# Patient Record
Sex: FEMALE | Race: WHITE | Employment: FULL TIME | URBAN - METROPOLITAN AREA
[De-identification: names, ages, dates, MRNs, and addresses within clinical notes are randomized per-mention and may not be internally consistent; named-entity substitution may affect disease eponyms.]

---

## 2017-01-10 RX ORDER — AMANTADINE HYDROCHLORIDE 100 MG/1
CAPSULE, GELATIN COATED ORAL
Qty: 60 CAPSULE | Refills: 0 | Status: SHIPPED | OUTPATIENT
Start: 2017-01-10 | End: 2017-02-07 | Stop reason: SDUPTHER

## 2017-01-20 RX ORDER — TRAZODONE HYDROCHLORIDE 100 MG/1
TABLET ORAL
Qty: 30 TABLET | Refills: 2 | Status: SHIPPED | OUTPATIENT
Start: 2017-01-20 | End: 2017-04-26 | Stop reason: SDUPTHER

## 2017-01-26 RX ORDER — ZOLPIDEM TARTRATE 10 MG/1
TABLET ORAL
Qty: 45 TABLET | Refills: 0 | OUTPATIENT
Start: 2017-01-26

## 2017-01-30 ENCOUNTER — TELEPHONE (OUTPATIENT)
Dept: FAMILY MEDICINE CLINIC | Age: 62
End: 2017-01-30

## 2017-01-31 ENCOUNTER — OFFICE VISIT (OUTPATIENT)
Dept: FAMILY MEDICINE CLINIC | Age: 62
End: 2017-01-31

## 2017-01-31 VITALS
SYSTOLIC BLOOD PRESSURE: 120 MMHG | OXYGEN SATURATION: 98 % | WEIGHT: 109.1 LBS | HEART RATE: 70 BPM | HEIGHT: 59 IN | BODY MASS INDEX: 22 KG/M2 | DIASTOLIC BLOOD PRESSURE: 80 MMHG

## 2017-01-31 DIAGNOSIS — M54.17 LUMBOSACRAL RADICULOPATHY: ICD-10-CM

## 2017-01-31 DIAGNOSIS — F51.02 ADJUSTMENT INSOMNIA: ICD-10-CM

## 2017-01-31 PROCEDURE — 99213 OFFICE O/P EST LOW 20 MIN: CPT | Performed by: FAMILY MEDICINE

## 2017-01-31 RX ORDER — ZOLPIDEM TARTRATE 10 MG/1
TABLET ORAL
Qty: 45 TABLET | Refills: 0 | Status: SHIPPED | OUTPATIENT
Start: 2017-01-31 | End: 2017-03-07 | Stop reason: SDUPTHER

## 2017-01-31 ASSESSMENT — ENCOUNTER SYMPTOMS
COUGH: 0
SWOLLEN GLANDS: 0
CHANGE IN BOWEL HABIT: 0
VOMITING: 0
SORE THROAT: 0
NAUSEA: 0
VISUAL CHANGE: 0
ABDOMINAL PAIN: 0

## 2017-03-07 RX ORDER — ZOLPIDEM TARTRATE 10 MG/1
TABLET ORAL
Qty: 45 TABLET | Refills: 0 | Status: SHIPPED | OUTPATIENT
Start: 2017-03-07 | End: 2017-04-07 | Stop reason: SDUPTHER

## 2017-04-07 RX ORDER — ZOLPIDEM TARTRATE 10 MG/1
TABLET ORAL
Qty: 45 TABLET | Refills: 0 | Status: SHIPPED | OUTPATIENT
Start: 2017-04-07 | End: 2017-05-12 | Stop reason: SDUPTHER

## 2017-04-26 RX ORDER — TRAZODONE HYDROCHLORIDE 100 MG/1
TABLET ORAL
Qty: 30 TABLET | Refills: 1 | Status: SHIPPED | OUTPATIENT
Start: 2017-04-26 | End: 2017-06-22 | Stop reason: SDUPTHER

## 2017-05-02 ENCOUNTER — OFFICE VISIT (OUTPATIENT)
Dept: FAMILY MEDICINE CLINIC | Age: 62
End: 2017-05-02

## 2017-05-02 VITALS
HEIGHT: 59 IN | OXYGEN SATURATION: 96 % | BODY MASS INDEX: 22.46 KG/M2 | WEIGHT: 111.4 LBS | HEART RATE: 91 BPM | SYSTOLIC BLOOD PRESSURE: 110 MMHG | DIASTOLIC BLOOD PRESSURE: 80 MMHG

## 2017-05-02 DIAGNOSIS — F51.02 ADJUSTMENT INSOMNIA: ICD-10-CM

## 2017-05-02 PROCEDURE — 99213 OFFICE O/P EST LOW 20 MIN: CPT | Performed by: FAMILY MEDICINE

## 2017-05-02 RX ORDER — IBUPROFEN 600 MG/1
600 TABLET ORAL
COMMUNITY
Start: 2017-03-29 | End: 2022-08-09

## 2017-05-02 RX ORDER — AMITRIPTYLINE HYDROCHLORIDE 10 MG/1
10 TABLET, FILM COATED ORAL NIGHTLY
COMMUNITY
End: 2018-03-06

## 2017-05-02 ASSESSMENT — ENCOUNTER SYMPTOMS
CHANGE IN BOWEL HABIT: 0
COUGH: 0
SORE THROAT: 0
NAUSEA: 0
VOMITING: 0
SWOLLEN GLANDS: 0
ABDOMINAL PAIN: 0
VISUAL CHANGE: 0

## 2017-05-12 ENCOUNTER — TELEPHONE (OUTPATIENT)
Dept: FAMILY MEDICINE CLINIC | Age: 62
End: 2017-05-12

## 2017-05-12 RX ORDER — ZOLPIDEM TARTRATE 10 MG/1
TABLET ORAL
Qty: 45 TABLET | Refills: 0 | Status: SHIPPED | OUTPATIENT
Start: 2017-05-12 | End: 2017-06-09 | Stop reason: SDUPTHER

## 2017-06-09 RX ORDER — ZOLPIDEM TARTRATE 10 MG/1
TABLET ORAL
Qty: 45 TABLET | Refills: 0 | Status: SHIPPED | OUTPATIENT
Start: 2017-06-09 | End: 2017-07-13 | Stop reason: SDUPTHER

## 2017-06-14 RX ORDER — ZOLPIDEM TARTRATE 10 MG/1
TABLET ORAL
Qty: 45 TABLET | Refills: 0 | Status: SHIPPED | OUTPATIENT
Start: 2017-06-14 | End: 2017-07-13

## 2017-06-22 RX ORDER — TRAZODONE HYDROCHLORIDE 100 MG/1
TABLET ORAL
Qty: 30 TABLET | Refills: 0 | Status: SHIPPED | OUTPATIENT
Start: 2017-06-22 | End: 2017-07-22 | Stop reason: SDUPTHER

## 2017-07-13 RX ORDER — ZOLPIDEM TARTRATE 10 MG/1
TABLET ORAL
Qty: 45 TABLET | Refills: 0 | Status: SHIPPED | OUTPATIENT
Start: 2017-07-13 | End: 2017-09-13 | Stop reason: SDUPTHER

## 2017-08-09 ENCOUNTER — OFFICE VISIT (OUTPATIENT)
Dept: FAMILY MEDICINE CLINIC | Age: 62
End: 2017-08-09

## 2017-08-09 VITALS
DIASTOLIC BLOOD PRESSURE: 80 MMHG | HEIGHT: 59 IN | SYSTOLIC BLOOD PRESSURE: 120 MMHG | WEIGHT: 109.3 LBS | BODY MASS INDEX: 22.04 KG/M2 | HEART RATE: 67 BPM | OXYGEN SATURATION: 98 %

## 2017-08-09 DIAGNOSIS — Z00.00 WELL ADULT EXAM: ICD-10-CM

## 2017-08-09 DIAGNOSIS — I10 ESSENTIAL HYPERTENSION: Primary | ICD-10-CM

## 2017-08-09 DIAGNOSIS — Z11.4 SCREENING FOR HIV WITHOUT PRESENCE OF RISK FACTORS: ICD-10-CM

## 2017-08-09 DIAGNOSIS — I10 ESSENTIAL HYPERTENSION: ICD-10-CM

## 2017-08-09 DIAGNOSIS — Z11.59 NEED FOR HEPATITIS C SCREENING TEST: ICD-10-CM

## 2017-08-09 DIAGNOSIS — F51.02 ADJUSTMENT INSOMNIA: ICD-10-CM

## 2017-08-09 DIAGNOSIS — Z12.11 COLON CANCER SCREENING: ICD-10-CM

## 2017-08-09 DIAGNOSIS — G89.4 CHRONIC PAIN SYNDROME: ICD-10-CM

## 2017-08-09 DIAGNOSIS — Z12.11 SCREENING FOR COLON CANCER: ICD-10-CM

## 2017-08-09 LAB
CHOLESTEROL, FASTING: 242 MG/DL (ref 0–199)
HDLC SERPL-MCNC: 91 MG/DL (ref 40–60)
HEPATITIS C ANTIBODY INTERPRETATION: NORMAL
LDL CHOLESTEROL CALCULATED: 109 MG/DL
TRIGLYCERIDE, FASTING: 211 MG/DL (ref 0–150)
VLDLC SERPL CALC-MCNC: 42 MG/DL

## 2017-08-09 PROCEDURE — 99213 OFFICE O/P EST LOW 20 MIN: CPT | Performed by: FAMILY MEDICINE

## 2017-08-09 RX ORDER — CHLORZOXAZONE 750 MG/1
750 TABLET ORAL
COMMUNITY
Start: 2017-06-22 | End: 2022-08-09

## 2017-08-09 RX ORDER — LISINOPRIL 10 MG/1
TABLET ORAL
Qty: 90 TABLET | Refills: 3 | Status: SHIPPED | OUTPATIENT
Start: 2017-08-09 | End: 2018-06-06 | Stop reason: SDUPTHER

## 2017-08-09 ASSESSMENT — ENCOUNTER SYMPTOMS
SHORTNESS OF BREATH: 0
ORTHOPNEA: 0
BLURRED VISION: 0

## 2017-08-09 ASSESSMENT — PATIENT HEALTH QUESTIONNAIRE - PHQ9
2. FEELING DOWN, DEPRESSED OR HOPELESS: 0
SUM OF ALL RESPONSES TO PHQ9 QUESTIONS 1 & 2: 0
1. LITTLE INTEREST OR PLEASURE IN DOING THINGS: 0
SUM OF ALL RESPONSES TO PHQ QUESTIONS 1-9: 0

## 2017-08-10 LAB — HIV-1 AND HIV-2 ANTIBODIES: NORMAL

## 2017-08-16 RX ORDER — AMANTADINE HYDROCHLORIDE 100 MG/1
CAPSULE, GELATIN COATED ORAL
Qty: 60 CAPSULE | Refills: 4 | Status: SHIPPED | OUTPATIENT
Start: 2017-08-16 | End: 2018-10-19 | Stop reason: SDUPTHER

## 2017-09-13 RX ORDER — ZOLPIDEM TARTRATE 10 MG/1
TABLET ORAL
Qty: 45 TABLET | Refills: 0 | Status: SHIPPED | OUTPATIENT
Start: 2017-09-13 | End: 2017-10-16 | Stop reason: SDUPTHER

## 2017-10-05 ENCOUNTER — TELEPHONE (OUTPATIENT)
Dept: FAMILY MEDICINE CLINIC | Age: 62
End: 2017-10-05

## 2017-10-05 RX ORDER — PREDNISONE 10 MG/1
10 TABLET ORAL DAILY
Qty: 5 TABLET | Refills: 0 | Status: SHIPPED | OUTPATIENT
Start: 2017-10-05 | End: 2017-10-10

## 2017-10-05 NOTE — TELEPHONE ENCOUNTER
Just pain. No other symptoms. It has happened before. Took a 5 mg prednisone and it helped. Was under a lot of stress on Tuesday night. It is better today but still painful around the edges. No drainage no redness, no swelling. She used some eye drops to clean it out and all it did was burn. Please advise.

## 2017-10-16 ENCOUNTER — TELEPHONE (OUTPATIENT)
Dept: FAMILY MEDICINE CLINIC | Age: 62
End: 2017-10-16

## 2017-10-16 RX ORDER — ZOLPIDEM TARTRATE 10 MG/1
TABLET ORAL
Qty: 45 TABLET | Refills: 0 | Status: SHIPPED | OUTPATIENT
Start: 2017-10-16 | End: 2017-11-15 | Stop reason: SDUPTHER

## 2017-11-16 RX ORDER — ZOLPIDEM TARTRATE 10 MG/1
TABLET ORAL
Qty: 45 TABLET | Refills: 0 | Status: SHIPPED | OUTPATIENT
Start: 2017-11-16 | End: 2017-12-21 | Stop reason: SDUPTHER

## 2017-12-06 ENCOUNTER — OFFICE VISIT (OUTPATIENT)
Dept: FAMILY MEDICINE CLINIC | Age: 62
End: 2017-12-06

## 2017-12-06 VITALS
BODY MASS INDEX: 22.98 KG/M2 | WEIGHT: 114 LBS | HEIGHT: 59 IN | OXYGEN SATURATION: 95 % | HEART RATE: 82 BPM | SYSTOLIC BLOOD PRESSURE: 120 MMHG | DIASTOLIC BLOOD PRESSURE: 70 MMHG

## 2017-12-06 DIAGNOSIS — I10 ESSENTIAL HYPERTENSION: ICD-10-CM

## 2017-12-06 DIAGNOSIS — F51.02 ADJUSTMENT INSOMNIA: ICD-10-CM

## 2017-12-06 PROCEDURE — 99213 OFFICE O/P EST LOW 20 MIN: CPT | Performed by: FAMILY MEDICINE

## 2017-12-06 RX ORDER — CYCLOBENZAPRINE HYDROCHLORIDE 15 MG/1
15 CAPSULE, EXTENDED RELEASE ORAL
COMMUNITY
Start: 2017-10-05

## 2017-12-06 ASSESSMENT — ENCOUNTER SYMPTOMS
ORTHOPNEA: 0
SHORTNESS OF BREATH: 0
BLURRED VISION: 0

## 2017-12-06 NOTE — PROGRESS NOTES
Subjective:      Patient ID: Talita Ayala is a 63 y.o. female. Hypertension   This is a chronic problem. The current episode started more than 1 year ago. The problem is unchanged. The problem is controlled. Pertinent negatives include no anxiety, blurred vision, chest pain, headaches, malaise/fatigue, neck pain, orthopnea, palpitations, peripheral edema, PND, shortness of breath or sweats. There are no associated agents to hypertension. Risk factors for coronary artery disease include family history. Past treatments include ACE inhibitors. The current treatment provides significant improvement. There are no compliance problems. There is no history of angina, kidney disease, CAD/MI, CVA, heart failure, left ventricular hypertrophy, PVD, renovascular disease, retinopathy or a thyroid problem. There is no history of chronic renal disease, coarctation of the aorta, hyperaldosteronism, hypercortisolism, hyperparathyroidism, a hypertension causing med, pheochromocytoma or sleep apnea. Review of Systems   Constitutional: Negative for malaise/fatigue. Eyes: Negative for blurred vision. Respiratory: Negative for shortness of breath. Cardiovascular: Negative for chest pain, palpitations, orthopnea and PND. Musculoskeletal: Negative for neck pain. Neurological: Negative for headaches. Objective:   Physical Exam   Constitutional: She is oriented to person, place, and time. She appears well-developed and well-nourished. No distress. HENT:   Mouth/Throat: Oropharynx is clear and moist.   Eyes: Conjunctivae are normal. Pupils are equal, round, and reactive to light. Neck: No JVD present. No tracheal deviation present. No thyromegaly present. Cardiovascular: Normal rate, regular rhythm, normal heart sounds and intact distal pulses. Exam reveals no gallop. No murmur heard. Pulmonary/Chest: Effort normal and breath sounds normal. No stridor. No respiratory distress. She has no wheezes.  She has no rales. She exhibits no tenderness. Abdominal: Soft. Bowel sounds are normal. She exhibits no distension and no mass. There is no tenderness. Musculoskeletal: She exhibits no edema or tenderness. Lymphadenopathy:     She has no cervical adenopathy. Neurological: She is alert and oriented to person, place, and time. She displays normal reflexes. No cranial nerve deficit. She exhibits normal muscle tone. Coordination normal.   Skin: Skin is warm and dry. No rash noted. No erythema. No pallor. Psychiatric: She has a normal mood and affect. Her behavior is normal. Judgment and thought content normal.   Vitals reviewed. Assessment:      Essential hypertension  Stable--same plan TOS and SE discussed    Insomnia  Stable--same plan    Controlled Substances Monitoring:     Attestation: The Prescription Monitoring Report for this patient was reviewed today. James Santos MD)  Documentation: Possible medication side effects, risk of tolerance and/or dependence, and alternative treatments discussed., No signs of potential drug abuse or diversion identified., Existing medication contract.  James Santos MD)              Plan:      above    --reviewed recent drug screen and OK

## 2017-12-21 RX ORDER — ZOLPIDEM TARTRATE 10 MG/1
TABLET ORAL
Qty: 45 TABLET | Refills: 0 | Status: SHIPPED | OUTPATIENT
Start: 2017-12-21 | End: 2018-01-23 | Stop reason: SDUPTHER

## 2018-01-18 RX ORDER — TRAZODONE HYDROCHLORIDE 100 MG/1
TABLET ORAL
Qty: 30 TABLET | Refills: 4 | Status: SHIPPED | OUTPATIENT
Start: 2018-01-18 | End: 2018-06-19 | Stop reason: SDUPTHER

## 2018-01-19 RX ORDER — TRAZODONE HYDROCHLORIDE 100 MG/1
TABLET ORAL
Qty: 30 TABLET | Refills: 4 | Status: SHIPPED | OUTPATIENT
Start: 2018-01-19 | End: 2018-06-19

## 2018-01-23 RX ORDER — ZOLPIDEM TARTRATE 10 MG/1
TABLET ORAL
Qty: 45 TABLET | Refills: 0 | Status: SHIPPED | OUTPATIENT
Start: 2018-01-23 | End: 2018-02-21 | Stop reason: SDUPTHER

## 2018-02-12 RX ORDER — PROMETHAZINE HYDROCHLORIDE 25 MG/1
TABLET ORAL
Qty: 40 TABLET | Refills: 1 | Status: SHIPPED | OUTPATIENT
Start: 2018-02-12 | End: 2018-02-13

## 2018-02-13 RX ORDER — PROMETHAZINE HYDROCHLORIDE 25 MG/1
TABLET ORAL
Qty: 40 TABLET | Refills: 1 | Status: SHIPPED | OUTPATIENT
Start: 2018-02-13 | End: 2019-04-07

## 2018-02-21 RX ORDER — ZOLPIDEM TARTRATE 10 MG/1
TABLET ORAL
Qty: 45 TABLET | Refills: 0 | Status: SHIPPED | OUTPATIENT
Start: 2018-02-21 | End: 2018-03-22 | Stop reason: SDUPTHER

## 2018-03-06 ENCOUNTER — OFFICE VISIT (OUTPATIENT)
Dept: FAMILY MEDICINE CLINIC | Age: 63
End: 2018-03-06

## 2018-03-06 VITALS
DIASTOLIC BLOOD PRESSURE: 80 MMHG | HEART RATE: 96 BPM | BODY MASS INDEX: 23.93 KG/M2 | HEIGHT: 59 IN | SYSTOLIC BLOOD PRESSURE: 120 MMHG | OXYGEN SATURATION: 98 % | WEIGHT: 118.7 LBS

## 2018-03-06 DIAGNOSIS — F51.02 ADJUSTMENT INSOMNIA: ICD-10-CM

## 2018-03-06 DIAGNOSIS — I10 ESSENTIAL HYPERTENSION: Primary | ICD-10-CM

## 2018-03-06 LAB
ANION GAP SERPL CALCULATED.3IONS-SCNC: 15 MMOL/L (ref 3–16)
BASOPHILS ABSOLUTE: 0 K/UL (ref 0–0.2)
BASOPHILS RELATIVE PERCENT: 0.7 %
BUN BLDV-MCNC: 23 MG/DL (ref 7–20)
CALCIUM SERPL-MCNC: 9.2 MG/DL (ref 8.3–10.6)
CHLORIDE BLD-SCNC: 105 MMOL/L (ref 99–110)
CO2: 24 MMOL/L (ref 21–32)
CREAT SERPL-MCNC: 0.7 MG/DL (ref 0.6–1.2)
EOSINOPHILS ABSOLUTE: 0 K/UL (ref 0–0.6)
EOSINOPHILS RELATIVE PERCENT: 0.7 %
GFR AFRICAN AMERICAN: >60
GFR NON-AFRICAN AMERICAN: >60
GLUCOSE BLD-MCNC: 77 MG/DL (ref 70–99)
HCT VFR BLD CALC: 33.4 % (ref 36–48)
HEMOGLOBIN: 11 G/DL (ref 12–16)
LYMPHOCYTES ABSOLUTE: 3.2 K/UL (ref 1–5.1)
LYMPHOCYTES RELATIVE PERCENT: 49 %
MCH RBC QN AUTO: 30.3 PG (ref 26–34)
MCHC RBC AUTO-ENTMCNC: 33.1 G/DL (ref 31–36)
MCV RBC AUTO: 91.8 FL (ref 80–100)
MONOCYTES ABSOLUTE: 0.4 K/UL (ref 0–1.3)
MONOCYTES RELATIVE PERCENT: 6.1 %
NEUTROPHILS ABSOLUTE: 2.8 K/UL (ref 1.7–7.7)
NEUTROPHILS RELATIVE PERCENT: 43.5 %
PDW BLD-RTO: 15.4 % (ref 12.4–15.4)
PLATELET # BLD: 238 K/UL (ref 135–450)
PMV BLD AUTO: 7.7 FL (ref 5–10.5)
POTASSIUM SERPL-SCNC: 4.9 MMOL/L (ref 3.5–5.1)
RBC # BLD: 3.64 M/UL (ref 4–5.2)
SODIUM BLD-SCNC: 144 MMOL/L (ref 136–145)
WBC # BLD: 6.5 K/UL (ref 4–11)

## 2018-03-06 PROCEDURE — 99213 OFFICE O/P EST LOW 20 MIN: CPT | Performed by: FAMILY MEDICINE

## 2018-03-06 RX ORDER — LANOLIN ALCOHOL/MO/W.PET/CERES
CREAM (GRAM) TOPICAL
COMMUNITY

## 2018-03-06 ASSESSMENT — ENCOUNTER SYMPTOMS
NAUSEA: 0
COUGH: 0
VOMITING: 0
SORE THROAT: 0
SWOLLEN GLANDS: 0
CHANGE IN BOWEL HABIT: 0
VISUAL CHANGE: 0
ABDOMINAL PAIN: 0

## 2018-03-22 RX ORDER — ZOLPIDEM TARTRATE 10 MG/1
TABLET ORAL
Qty: 45 TABLET | Refills: 0 | Status: SHIPPED | OUTPATIENT
Start: 2018-03-22 | End: 2018-04-23 | Stop reason: SDUPTHER

## 2018-04-23 RX ORDER — ZOLPIDEM TARTRATE 10 MG/1
TABLET ORAL
Qty: 45 TABLET | Refills: 0 | Status: SHIPPED | OUTPATIENT
Start: 2018-04-23 | End: 2018-05-25 | Stop reason: SDUPTHER

## 2018-05-25 RX ORDER — ZOLPIDEM TARTRATE 10 MG/1
TABLET ORAL
Qty: 45 TABLET | Refills: 0 | Status: SHIPPED | OUTPATIENT
Start: 2018-05-25 | End: 2018-06-28 | Stop reason: SDUPTHER

## 2018-06-06 ENCOUNTER — OFFICE VISIT (OUTPATIENT)
Dept: FAMILY MEDICINE CLINIC | Age: 63
End: 2018-06-06

## 2018-06-06 VITALS
DIASTOLIC BLOOD PRESSURE: 70 MMHG | HEART RATE: 85 BPM | BODY MASS INDEX: 24.72 KG/M2 | HEIGHT: 59 IN | WEIGHT: 122.6 LBS | OXYGEN SATURATION: 97 % | SYSTOLIC BLOOD PRESSURE: 120 MMHG

## 2018-06-06 DIAGNOSIS — I10 ESSENTIAL HYPERTENSION: ICD-10-CM

## 2018-06-06 DIAGNOSIS — F51.02 ADJUSTMENT INSOMNIA: ICD-10-CM

## 2018-06-06 PROCEDURE — 99213 OFFICE O/P EST LOW 20 MIN: CPT | Performed by: FAMILY MEDICINE

## 2018-06-06 RX ORDER — LISINOPRIL 10 MG/1
TABLET ORAL
Qty: 90 TABLET | Refills: 3 | Status: SHIPPED | OUTPATIENT
Start: 2018-06-06 | End: 2019-10-03 | Stop reason: SDUPTHER

## 2018-06-06 ASSESSMENT — ENCOUNTER SYMPTOMS
COUGH: 0
ABDOMINAL PAIN: 0
VOMITING: 0
VISUAL CHANGE: 0
CHANGE IN BOWEL HABIT: 0
SWOLLEN GLANDS: 0
NAUSEA: 0
SORE THROAT: 0

## 2018-06-19 RX ORDER — TRAZODONE HYDROCHLORIDE 100 MG/1
TABLET ORAL
Qty: 30 TABLET | Refills: 3 | Status: SHIPPED | OUTPATIENT
Start: 2018-06-19 | End: 2019-02-13

## 2018-07-27 ENCOUNTER — CLINICAL DOCUMENTATION (OUTPATIENT)
Dept: OTHER | Facility: CLINIC | Age: 63
End: 2018-07-27

## 2018-07-27 ENCOUNTER — TELEPHONE (OUTPATIENT)
Dept: FAMILY MEDICINE CLINIC | Age: 63
End: 2018-07-27

## 2018-07-27 NOTE — TELEPHONE ENCOUNTER
Had colonoscopy today  She doesn't know the doctor. Had it done at [facility].

## 2018-09-05 ENCOUNTER — OFFICE VISIT (OUTPATIENT)
Dept: FAMILY MEDICINE CLINIC | Age: 63
End: 2018-09-05

## 2018-09-05 VITALS
HEART RATE: 78 BPM | OXYGEN SATURATION: 95 % | BODY MASS INDEX: 25.08 KG/M2 | DIASTOLIC BLOOD PRESSURE: 74 MMHG | HEIGHT: 59 IN | WEIGHT: 124.4 LBS | SYSTOLIC BLOOD PRESSURE: 110 MMHG

## 2018-09-05 DIAGNOSIS — G89.4 CHRONIC PAIN SYNDROME: ICD-10-CM

## 2018-09-05 DIAGNOSIS — F51.02 ADJUSTMENT INSOMNIA: ICD-10-CM

## 2018-09-05 PROCEDURE — 99213 OFFICE O/P EST LOW 20 MIN: CPT | Performed by: FAMILY MEDICINE

## 2018-09-05 ASSESSMENT — ENCOUNTER SYMPTOMS
ANAL BLEEDING: 0
SORE THROAT: 0
STRIDOR: 0
DIARRHEA: 0
TROUBLE SWALLOWING: 0
EYE DISCHARGE: 0
FACIAL SWELLING: 0
EYE ITCHING: 0
RHINORRHEA: 0
CHEST TIGHTNESS: 0
VOICE CHANGE: 0
EYE REDNESS: 0
ABDOMINAL PAIN: 0
VOMITING: 0
EYE PAIN: 0
RECTAL PAIN: 0
BLOOD IN STOOL: 0
CONSTIPATION: 0
SHORTNESS OF BREATH: 0
BACK PAIN: 0
APNEA: 0
COLOR CHANGE: 0
SINUS PRESSURE: 0
CHOKING: 0
PHOTOPHOBIA: 0
COUGH: 0
WHEEZING: 0
ABDOMINAL DISTENTION: 0
NAUSEA: 0

## 2018-09-05 ASSESSMENT — PATIENT HEALTH QUESTIONNAIRE - PHQ9
SUM OF ALL RESPONSES TO PHQ QUESTIONS 1-9: 0
2. FEELING DOWN, DEPRESSED OR HOPELESS: 0
SUM OF ALL RESPONSES TO PHQ9 QUESTIONS 1 & 2: 0
1. LITTLE INTEREST OR PLEASURE IN DOING THINGS: 0
SUM OF ALL RESPONSES TO PHQ QUESTIONS 1-9: 0

## 2018-09-05 NOTE — PROGRESS NOTES
Past Surgical History:   Procedure Laterality Date    LUMBAR LAMINECTOMY  2009.2010    SPINE SURGERY  04/2016    spinal chord stimulator    UPPER GASTROINTESTINAL ENDOSCOPY  2007    ? 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    Smoking status: Never Smoker    Smokeless tobacco: Never Used    Alcohol use No    Drug use: No    Sexual activity: Not Currently     Partners: Male     Other Topics Concern    Not on file     Social History Narrative    Works 36/w(nights)    Hobbies; reads    Exercise; little    + seatbelts       Family History   Problem Relation Age of Onset    Cancer Mother         breast    High Blood Pressure Mother     Alzheimer's Disease Father     High Blood Pressure Father        Immunization History   Administered Date(s) Administered    Influenza Vaccine, unspecified formulation 10/10/2016    Influenza Virus Vaccine 09/12/2011    Tdap (Boostrix, Adacel) 02/02/2012    Zoster Live (Zostavax) 10/10/2016       Review of Systems  Review of Systems   Constitutional: Negative for activity change, appetite change, chills, diaphoresis, fatigue, fever and unexpected weight change. HENT: Negative for congestion, dental problem, drooling, ear discharge, ear pain, facial swelling, hearing loss, mouth sores, nosebleeds, postnasal drip, rhinorrhea, sinus pressure, sneezing, sore throat, tinnitus, trouble swallowing and voice change. Eyes: Negative for photophobia, pain, discharge, redness, itching and visual disturbance. Respiratory: Negative for apnea, cough, choking, chest tightness, shortness of breath, wheezing and stridor. Cardiovascular: Negative for chest pain, palpitations and leg swelling.    Gastrointestinal: Negative for abdominal distention, abdominal pain, anal bleeding, blood in is warm and dry. No rash noted. No erythema. No pallor. Psychiatric: She has a normal mood and affect. Her behavior is normal. Judgment and thought content normal.   Vitals reviewed. Date: 7/27/2018 Time: 9:53 AM    Back to top of Miscellaneous Notes  Op Note - Wen Brady MD - 07/27/2018 9:27 AM EDT  VIA [facility]   _______________________________________________________________________________  Patient Name: Ginny Hernandez Procedure Date: 7/27/2018 9:27 AM  MRN: [id number] YOB: 1955  Attending MD: Shaq Waters MD Account Number: [id number]  Order #:   _______________________________________________________________________________    Procedure: Colonoscopy  Indications: Screening for colorectal malignant neoplasm  Referring MD:   Medicines: Monitored Anesthesia Care  Complications: No immediate complications. _______________________________________________________________________________  Procedure: Pre-Anesthesia Assessment:  - Prior to the procedure, a History and Physical was   performed, and patient medications and allergies were   reviewed. The patient's tolerance of previous anesthesia   was also reviewed. The risks and benefits of the   procedure and the sedation options and risks were   discussed with the patient. All questions were answered,   and informed consent was obtained. Prior Anticoagulants: The patient has taken no previous anticoagulant or   antiplatelet agents. ASA Grade Assessment: III - A   patient with severe systemic disease. After reviewing   the risks and benefits, the patient was deemed in   satisfactory condition to undergo the procedure. After I obtained informed consent, the scope was passed   under direct vision. Throughout the procedure, the   patient's blood pressure, pulse, and oxygen saturations   were monitored continuously.  The endoscope was   introduced through the anus and advanced to the cecum,   identified by appendiceal orifice

## 2018-09-05 NOTE — ASSESSMENT & PLAN NOTE
Stable with current plan    Controlled Substances Monitoring:     RX Monitoring 9/5/2018   Attestation The Prescription Monitoring Report for this patient was reviewed today. Documentation Possible medication side effects, risk of tolerance/dependence & alternative treatments discussed. ;No signs of potential drug abuse or diversion identified. Medication Contracts Existing medication contract.

## 2018-10-03 DIAGNOSIS — F51.02 ADJUSTMENT INSOMNIA: ICD-10-CM

## 2018-10-03 RX ORDER — ZOLPIDEM TARTRATE 10 MG/1
TABLET ORAL
Qty: 45 TABLET | Refills: 0 | Status: SHIPPED | OUTPATIENT
Start: 2018-10-03 | End: 2018-11-06 | Stop reason: SDUPTHER

## 2018-10-19 ENCOUNTER — TELEPHONE (OUTPATIENT)
Dept: FAMILY MEDICINE CLINIC | Age: 63
End: 2018-10-19

## 2018-10-19 RX ORDER — AMANTADINE HYDROCHLORIDE 100 MG/1
CAPSULE, GELATIN COATED ORAL
Qty: 60 CAPSULE | Refills: 4 | Status: SHIPPED | OUTPATIENT
Start: 2018-10-19 | End: 2019-08-30 | Stop reason: SDUPTHER

## 2018-10-19 NOTE — TELEPHONE ENCOUNTER
Patient requesting a medication refill.   Medication amantadine (SYMMETREL)  Dosage 100 MG capsule   FrequencyTAKE ONE CAPSULE BY MOUTH TWICE A DAY  Last filled on 8/16/17  [facility] [place] [place] [address] - P [phone] - F [phone]

## 2018-11-06 DIAGNOSIS — F51.02 ADJUSTMENT INSOMNIA: ICD-10-CM

## 2018-11-07 RX ORDER — ZOLPIDEM TARTRATE 10 MG/1
TABLET ORAL
Qty: 45 TABLET | Refills: 0 | Status: SHIPPED | OUTPATIENT
Start: 2018-11-07 | End: 2018-12-10 | Stop reason: SDUPTHER

## 2018-12-03 ENCOUNTER — OFFICE VISIT (OUTPATIENT)
Dept: FAMILY MEDICINE CLINIC | Age: 63
End: 2018-12-03
Payer: COMMERCIAL

## 2018-12-03 VITALS
OXYGEN SATURATION: 99 % | BODY MASS INDEX: 25.2 KG/M2 | HEIGHT: 59 IN | SYSTOLIC BLOOD PRESSURE: 112 MMHG | WEIGHT: 125 LBS | DIASTOLIC BLOOD PRESSURE: 76 MMHG | HEART RATE: 74 BPM

## 2018-12-03 DIAGNOSIS — Z12.39 BREAST CANCER SCREENING: ICD-10-CM

## 2018-12-03 DIAGNOSIS — F51.02 ADJUSTMENT INSOMNIA: Primary | ICD-10-CM

## 2018-12-03 DIAGNOSIS — Z23 NEED FOR SHINGLES VACCINE: ICD-10-CM

## 2018-12-03 PROCEDURE — 99213 OFFICE O/P EST LOW 20 MIN: CPT | Performed by: FAMILY MEDICINE

## 2018-12-03 RX ORDER — PREDNISONE 1 MG/1
5 TABLET ORAL DAILY
Qty: 21 TABLET | Refills: 0 | Status: SHIPPED | OUTPATIENT
Start: 2018-12-03 | End: 2018-12-09

## 2018-12-03 NOTE — PROGRESS NOTES
Subjective:     Patient ID:Carmen Love is a 66 y.o. female. HPI     Insomnia:  Current treatment: prescription sleep aid- Ambien- 5-10, which has been effective. Medication side effects: none. Allergies   Allergen Reactions    Aspirin      GI Bleed    Sulfa Antibiotics Rash       Current Outpatient Prescriptions   Medication Sig Dispense Refill    zolpidem (AMBIEN) 10 MG tablet TAKE ONE AND ONE-HALF (1 & 1/2) TABLET BY MOUTH ONCE NIGHTLY AS NEEDED 45 tablet 0    amantadine (SYMMETREL) 100 MG capsule TAKE ONE CAPSULE BY MOUTH TWICE A DAY 60 capsule 4    metoprolol tartrate (LOPRESSOR) 25 MG tablet TAKE ONE TABLET BY MOUTH TWICE A DAY 60 tablet 5    traZODone (DESYREL) 100 MG tablet TAKE ONE TABLET BY MOUTH EVERY NIGHT AT BEDTIME 30 tablet 3    lisinopril (PRINIVIL;ZESTRIL) 10 MG tablet TAKE ONE TABLET BY MOUTH DAILY 90 tablet 3    Biotin 300 MCG TABS Take by mouth      promethazine (PHENERGAN) 25 MG tablet TAKE ONE TABLET BY MOUTH EVERY 6 HOURS AS NEEDED 40 tablet 1    cyclobenzaprine (AMRIX) 15 MG extended release capsule Take 15 mg by mouth      chlorzoxazone (PARAFON FORTE) 750 MG TABS tablet Take 750 mg by mouth      ibuprofen (ADVIL;MOTRIN) 600 MG tablet Take 600 mg by mouth      cetirizine (ZYRTEC) 10 MG tablet Take 10 mg by mouth      Cholecalciferol 2000 UNITS CAPS Take by mouth      docusate sodium (COLACE) 100 MG capsule Take 100 mg by mouth      famotidine (PEPCID) 40 MG tablet Take 40 mg by mouth      melatonin 5 MG TABS tablet Take 5 mg by mouth daily      HYDROcodone-acetaminophen (NORCO) 7.5-325 MG per tablet Take 1 tablet by mouth every 6 hours as needed for Pain      gabapentin (NEURONTIN) 800 MG tablet Take 800 mg by mouth 4 times daily        No current facility-administered medications for this visit.         Past Medical History:   Diagnosis Date    Chronic pain syndrome     lumbar spine--sees Dr. Ciro Valdes    Hypertension     Insomnia     Tachycardia        Past exhibits no distension and no mass. There is no tenderness. Musculoskeletal: She exhibits no edema or tenderness. Lymphadenopathy:     She has no cervical adenopathy. Neurological: She is alert and oriented to person, place, and time. She displays normal reflexes. No cranial nerve deficit. She exhibits normal muscle tone. Coordination normal.   Skin: Skin is warm and dry. No rash noted. No erythema. No pallor. Psychiatric: She has a normal mood and affect. Her behavior is normal. Judgment and thought content normal.   Vitals reviewed. Assessment and Plan:     No problem-specific Assessment & Plan notes found for this encounter. Insomnia  Stable with current plan--    Controlled Substances Monitoring:     RX Monitoring 12/3/2018   Attestation The Prescription Monitoring Report for this patient was reviewed today. Documentation Possible medication side effects, risk of tolerance/dependence & alternative treatments discussed. ;No signs of potential drug abuse or diversion identified. Medication Contracts Existing medication contract.

## 2018-12-03 NOTE — ASSESSMENT & PLAN NOTE
Stable with current plan--    Controlled Substances Monitoring:     RX Monitoring 12/3/2018   Attestation The Prescription Monitoring Report for this patient was reviewed today. Documentation Possible medication side effects, risk of tolerance/dependence & alternative treatments discussed. ;No signs of potential drug abuse or diversion identified. Medication Contracts Existing medication contract.

## 2018-12-10 DIAGNOSIS — F51.02 ADJUSTMENT INSOMNIA: ICD-10-CM

## 2018-12-10 RX ORDER — ZOLPIDEM TARTRATE 10 MG/1
TABLET ORAL
Qty: 45 TABLET | Refills: 0 | Status: SHIPPED | OUTPATIENT
Start: 2018-12-10 | End: 2019-01-14 | Stop reason: SDUPTHER

## 2019-01-14 DIAGNOSIS — F51.02 ADJUSTMENT INSOMNIA: ICD-10-CM

## 2019-01-15 RX ORDER — ZOLPIDEM TARTRATE 10 MG/1
TABLET ORAL
Qty: 45 TABLET | Refills: 0 | Status: SHIPPED | OUTPATIENT
Start: 2019-01-15 | End: 2019-02-18 | Stop reason: SDUPTHER

## 2019-02-13 RX ORDER — TRAZODONE HYDROCHLORIDE 100 MG/1
TABLET ORAL
Qty: 30 TABLET | Refills: 3 | Status: SHIPPED | OUTPATIENT
Start: 2019-02-13 | End: 2019-06-14 | Stop reason: SDUPTHER

## 2019-03-06 ENCOUNTER — OFFICE VISIT (OUTPATIENT)
Dept: FAMILY MEDICINE CLINIC | Age: 64
End: 2019-03-06
Payer: COMMERCIAL

## 2019-03-06 VITALS
HEART RATE: 79 BPM | OXYGEN SATURATION: 99 % | HEIGHT: 59 IN | BODY MASS INDEX: 26.41 KG/M2 | DIASTOLIC BLOOD PRESSURE: 70 MMHG | SYSTOLIC BLOOD PRESSURE: 100 MMHG | WEIGHT: 131 LBS

## 2019-03-06 DIAGNOSIS — I10 ESSENTIAL HYPERTENSION: Primary | ICD-10-CM

## 2019-03-06 DIAGNOSIS — F51.02 ADJUSTMENT INSOMNIA: ICD-10-CM

## 2019-03-06 PROCEDURE — 99213 OFFICE O/P EST LOW 20 MIN: CPT | Performed by: FAMILY MEDICINE

## 2019-03-06 ASSESSMENT — PATIENT HEALTH QUESTIONNAIRE - PHQ9
1. LITTLE INTEREST OR PLEASURE IN DOING THINGS: 0
SUM OF ALL RESPONSES TO PHQ QUESTIONS 1-9: 0
SUM OF ALL RESPONSES TO PHQ QUESTIONS 1-9: 0
2. FEELING DOWN, DEPRESSED OR HOPELESS: 0
SUM OF ALL RESPONSES TO PHQ9 QUESTIONS 1 & 2: 0

## 2019-03-06 ASSESSMENT — ENCOUNTER SYMPTOMS
SHORTNESS OF BREATH: 0
BLURRED VISION: 0
ORTHOPNEA: 0

## 2019-03-26 DIAGNOSIS — F51.02 ADJUSTMENT INSOMNIA: ICD-10-CM

## 2019-03-26 RX ORDER — ZOLPIDEM TARTRATE 10 MG/1
TABLET ORAL
Qty: 45 TABLET | Refills: 0 | Status: SHIPPED | OUTPATIENT
Start: 2019-03-26 | End: 2019-04-28 | Stop reason: SDUPTHER

## 2019-04-07 RX ORDER — PROMETHAZINE HYDROCHLORIDE 25 MG/1
TABLET ORAL
Qty: 40 TABLET | Refills: 0 | Status: SHIPPED | OUTPATIENT
Start: 2019-04-07 | End: 2019-06-24 | Stop reason: SDUPTHER

## 2019-04-28 DIAGNOSIS — F51.02 ADJUSTMENT INSOMNIA: ICD-10-CM

## 2019-04-29 RX ORDER — ZOLPIDEM TARTRATE 10 MG/1
TABLET ORAL
Qty: 45 TABLET | Refills: 0 | Status: SHIPPED | OUTPATIENT
Start: 2019-04-29 | End: 2019-05-28 | Stop reason: SDUPTHER

## 2019-05-28 DIAGNOSIS — F51.02 ADJUSTMENT INSOMNIA: ICD-10-CM

## 2019-05-29 RX ORDER — ZOLPIDEM TARTRATE 10 MG/1
TABLET ORAL
Qty: 45 TABLET | Refills: 0 | Status: SHIPPED | OUTPATIENT
Start: 2019-05-29 | End: 2019-07-03 | Stop reason: SDUPTHER

## 2019-06-14 RX ORDER — TRAZODONE HYDROCHLORIDE 100 MG/1
TABLET ORAL
Qty: 30 TABLET | Refills: 2 | Status: SHIPPED | OUTPATIENT
Start: 2019-06-14 | End: 2019-09-10 | Stop reason: SDUPTHER

## 2019-06-24 RX ORDER — PROMETHAZINE HYDROCHLORIDE 25 MG/1
TABLET ORAL
Qty: 40 TABLET | Refills: 0 | Status: SHIPPED | OUTPATIENT
Start: 2019-06-24 | End: 2019-09-15 | Stop reason: SDUPTHER

## 2019-07-02 ENCOUNTER — TELEPHONE (OUTPATIENT)
Dept: FAMILY MEDICINE CLINIC | Age: 64
End: 2019-07-02

## 2019-07-02 NOTE — TELEPHONE ENCOUNTER
Pt called saying that she's out of the zolpidem and was told that she's need a appt before it can be prescribed. Pt appt is on 7/11 and she want to know if she can have med to hold her over until then?  Pls contact pt at ph# [phone]

## 2019-07-03 ENCOUNTER — OFFICE VISIT (OUTPATIENT)
Dept: FAMILY MEDICINE CLINIC | Age: 64
End: 2019-07-03
Payer: COMMERCIAL

## 2019-07-03 VITALS
OXYGEN SATURATION: 98 % | BODY MASS INDEX: 26.23 KG/M2 | DIASTOLIC BLOOD PRESSURE: 64 MMHG | HEART RATE: 90 BPM | WEIGHT: 129.85 LBS | SYSTOLIC BLOOD PRESSURE: 110 MMHG

## 2019-07-03 DIAGNOSIS — F51.02 ADJUSTMENT INSOMNIA: ICD-10-CM

## 2019-07-03 PROCEDURE — 99213 OFFICE O/P EST LOW 20 MIN: CPT | Performed by: NURSE PRACTITIONER

## 2019-07-03 RX ORDER — ZOLPIDEM TARTRATE 10 MG/1
TABLET ORAL
Qty: 45 TABLET | Refills: 0 | Status: SHIPPED | OUTPATIENT
Start: 2019-07-03 | End: 2019-08-04 | Stop reason: SDUPTHER

## 2019-08-04 DIAGNOSIS — F51.02 ADJUSTMENT INSOMNIA: ICD-10-CM

## 2019-08-05 RX ORDER — ZOLPIDEM TARTRATE 10 MG/1
TABLET ORAL
Qty: 45 TABLET | Refills: 0 | Status: SHIPPED | OUTPATIENT
Start: 2019-08-05 | End: 2019-09-06 | Stop reason: SDUPTHER

## 2019-08-30 RX ORDER — AMANTADINE HYDROCHLORIDE 100 MG/1
CAPSULE, GELATIN COATED ORAL
Qty: 60 CAPSULE | Refills: 3 | Status: SHIPPED | OUTPATIENT
Start: 2019-08-30 | End: 2019-12-31

## 2019-09-06 DIAGNOSIS — F51.02 ADJUSTMENT INSOMNIA: ICD-10-CM

## 2019-09-06 RX ORDER — ZOLPIDEM TARTRATE 10 MG/1
TABLET ORAL
Qty: 45 TABLET | Refills: 0 | Status: SHIPPED | OUTPATIENT
Start: 2019-09-06 | End: 2019-10-03 | Stop reason: SDUPTHER

## 2019-09-10 RX ORDER — TRAZODONE HYDROCHLORIDE 100 MG/1
TABLET ORAL
Qty: 30 TABLET | Refills: 1 | Status: SHIPPED | OUTPATIENT
Start: 2019-09-10 | End: 2019-11-07 | Stop reason: SDUPTHER

## 2019-09-16 RX ORDER — PROMETHAZINE HYDROCHLORIDE 25 MG/1
TABLET ORAL
Qty: 40 TABLET | Refills: 0 | Status: SHIPPED | OUTPATIENT
Start: 2019-09-16 | End: 2020-01-30

## 2019-10-03 ENCOUNTER — OFFICE VISIT (OUTPATIENT)
Dept: FAMILY MEDICINE CLINIC | Age: 64
End: 2019-10-03
Payer: COMMERCIAL

## 2019-10-03 VITALS
WEIGHT: 132 LBS | BODY MASS INDEX: 26.61 KG/M2 | OXYGEN SATURATION: 98 % | HEART RATE: 91 BPM | HEIGHT: 59 IN | DIASTOLIC BLOOD PRESSURE: 80 MMHG | SYSTOLIC BLOOD PRESSURE: 124 MMHG

## 2019-10-03 DIAGNOSIS — I10 ESSENTIAL HYPERTENSION: Primary | ICD-10-CM

## 2019-10-03 DIAGNOSIS — F51.02 ADJUSTMENT INSOMNIA: ICD-10-CM

## 2019-10-03 PROCEDURE — 99213 OFFICE O/P EST LOW 20 MIN: CPT | Performed by: NURSE PRACTITIONER

## 2019-10-03 RX ORDER — OXYCODONE HYDROCHLORIDE AND ACETAMINOPHEN 5; 325 MG/1; MG/1
1 TABLET ORAL
COMMUNITY
Start: 2019-09-17 | End: 2019-10-17

## 2019-10-03 RX ORDER — ZOLPIDEM TARTRATE 10 MG/1
TABLET ORAL
Qty: 45 TABLET | Refills: 0 | Status: SHIPPED | OUTPATIENT
Start: 2019-10-03 | End: 2019-11-03 | Stop reason: SDUPTHER

## 2019-10-03 RX ORDER — LISINOPRIL 10 MG/1
TABLET ORAL
Qty: 90 TABLET | Refills: 3 | Status: SHIPPED | OUTPATIENT
Start: 2019-10-03 | End: 2020-10-04

## 2019-11-07 RX ORDER — TRAZODONE HYDROCHLORIDE 100 MG/1
TABLET ORAL
Qty: 30 TABLET | Refills: 0 | Status: SHIPPED | OUTPATIENT
Start: 2019-11-07 | End: 2019-12-04 | Stop reason: SDUPTHER

## 2019-12-04 RX ORDER — TRAZODONE HYDROCHLORIDE 100 MG/1
TABLET ORAL
Qty: 30 TABLET | Refills: 0 | Status: SHIPPED | OUTPATIENT
Start: 2019-12-04 | End: 2020-01-03

## 2019-12-06 DIAGNOSIS — F51.02 ADJUSTMENT INSOMNIA: ICD-10-CM

## 2019-12-06 RX ORDER — ZOLPIDEM TARTRATE 10 MG/1
TABLET ORAL
Qty: 45 TABLET | Refills: 0 | Status: SHIPPED | OUTPATIENT
Start: 2019-12-06 | End: 2020-01-08 | Stop reason: SDUPTHER

## 2019-12-31 RX ORDER — AMANTADINE HYDROCHLORIDE 100 MG/1
CAPSULE, GELATIN COATED ORAL
Qty: 60 CAPSULE | Refills: 2 | Status: SHIPPED | OUTPATIENT
Start: 2019-12-31 | End: 2020-04-08

## 2020-01-03 RX ORDER — TRAZODONE HYDROCHLORIDE 100 MG/1
TABLET ORAL
Qty: 30 TABLET | Refills: 0 | Status: SHIPPED | OUTPATIENT
Start: 2020-01-03 | End: 2020-01-08 | Stop reason: SDUPTHER

## 2020-01-08 ENCOUNTER — OFFICE VISIT (OUTPATIENT)
Dept: FAMILY MEDICINE CLINIC | Age: 65
End: 2020-01-08
Payer: COMMERCIAL

## 2020-01-08 VITALS
DIASTOLIC BLOOD PRESSURE: 84 MMHG | BODY MASS INDEX: 26.58 KG/M2 | SYSTOLIC BLOOD PRESSURE: 120 MMHG | WEIGHT: 131.6 LBS | OXYGEN SATURATION: 98 % | HEART RATE: 74 BPM

## 2020-01-08 PROCEDURE — 99213 OFFICE O/P EST LOW 20 MIN: CPT | Performed by: NURSE PRACTITIONER

## 2020-01-08 RX ORDER — TRAZODONE HYDROCHLORIDE 100 MG/1
TABLET ORAL
Qty: 30 TABLET | Refills: 0 | Status: SHIPPED | OUTPATIENT
Start: 2020-01-08 | End: 2020-02-19 | Stop reason: SDUPTHER

## 2020-01-08 RX ORDER — ZOLPIDEM TARTRATE 10 MG/1
TABLET ORAL
Qty: 45 TABLET | Refills: 0 | Status: SHIPPED | OUTPATIENT
Start: 2020-01-08 | End: 2020-02-08

## 2020-01-08 NOTE — PROGRESS NOTES
Subjective:      Patient ID: Thomas Salguero is a 60 y.o. female who presents for:      Chief Complaint   Patient presents with    3 Month Follow-Up     med check     Insomnia:  Current treatment: prescription sleep aid- Ambien- and trazodone, which has been effective. Medication side effects: none. HPI    Review of Systems   All other systems reviewed and are negative. Past Medical History:   Diagnosis Date    Chronic pain syndrome     lumbar spine--sees Dr. Roxy Orellana    Hypertension     Insomnia     Tachycardia        Past Surgical History:   Procedure Laterality Date    COLONOSCOPY  07/27/2018    neg q 10    LUMBAR LAMINECTOMY  2009.2010    SPINE SURGERY  04/2016    spinal chord stimulator    UPPER GASTROINTESTINAL ENDOSCOPY  2007    ?  gastroporesis       Family History   Problem Relation Age of Onset    Cancer Mother         breast    High Blood Pressure Mother     Alzheimer's Disease Father     High Blood Pressure Father        Allergies   Allergen Reactions    Aspirin      GI Bleed    Sulfa Antibiotics Rash       Current Outpatient Medications   Medication Sig Dispense Refill    traZODone (DESYREL) 100 MG tablet TAKE ONE TABLET BY MOUTH EVERY NIGHT AT BEDTIME 30 tablet 0    zolpidem (AMBIEN) 10 MG tablet Take 1.5 pills per night for sleep 45 tablet 0    amantadine (SYMMETREL) 100 MG capsule TAKE ONE CAPSULE BY MOUTH TWICE A DAY 60 capsule 2    metoprolol tartrate (LOPRESSOR) 25 MG tablet TAKE ONE TABLET BY MOUTH TWICE A DAY 60 tablet 3    lisinopril (PRINIVIL;ZESTRIL) 10 MG tablet TAKE ONE TABLET BY MOUTH DAILY 90 tablet 3    promethazine (PHENERGAN) 25 MG tablet TAKE ONE TABLET BY MOUTH EVERY 6 HOURS AS NEEDED 40 tablet 0    Biotin 300 MCG TABS Take by mouth      cyclobenzaprine (AMRIX) 15 MG extended release capsule Take 15 mg by mouth      chlorzoxazone (PARAFON FORTE) 750 MG TABS tablet Take 750 mg by mouth      ibuprofen (ADVIL;MOTRIN) 600 MG tablet Take 600 mg by mouth      cetirizine (ZYRTEC) 10 MG tablet Take 10 mg by mouth      Cholecalciferol 2000 UNITS CAPS Take by mouth      docusate sodium (COLACE) 100 MG capsule Take 100 mg by mouth      famotidine (PEPCID) 40 MG tablet Take 40 mg by mouth      melatonin 5 MG TABS tablet Take 5 mg by mouth daily      HYDROcodone-acetaminophen (NORCO) 7.5-325 MG per tablet Take 1 tablet by mouth every 6 hours as needed for Pain      gabapentin (NEURONTIN) 800 MG tablet Take 800 mg by mouth 4 times daily        No current facility-administered medications for this visit. Objective:     Vitals:    01/08/20 1336   BP: 120/84   Pulse: 74   SpO2: 98%       Physical Exam  Constitutional:       Appearance: She is well-developed. HENT:      Head: Normocephalic. Eyes:      Pupils: Pupils are equal, round, and reactive to light. Neck:      Musculoskeletal: Normal range of motion. Cardiovascular:      Rate and Rhythm: Normal rate. Pulmonary:      Effort: Pulmonary effort is normal.   Musculoskeletal: Normal range of motion. Skin:     General: Skin is warm and dry. Neurological:      Mental Status: She is alert and oriented to person, place, and time. Assessment & Plan:     Health Maintenance   Topic Date Due    Cervical cancer screen  04/02/2016    Potassium monitoring  06/21/2020    Creatinine monitoring  06/21/2020    Breast cancer screen  03/28/2021    DTaP/Tdap/Td vaccine (2 - Td) 02/02/2022    Lipid screen  08/09/2022    Colon cancer screen colonoscopy  07/27/2028    Flu vaccine  Completed    Shingles Vaccine  Completed    Hepatitis C screen  Completed    HIV screen  Completed    Pneumococcal 0-64 years Vaccine  Aged Out        Diagnosis Orders   1.  Adjustment insomnia  traZODone (DESYREL) 100 MG tablet    zolpidem (AMBIEN) 10 MG tablet       Insomnia  Controlled Substance Monitoring:    Acute and Chronic Pain Monitoring:   RX Monitoring 7/3/2019   Attestation -   Periodic Controlled Substance Monitoring Possible medication side effects, risk of tolerance/dependence & alternative treatments discussed. ;No signs of potential drug abuse or diversion identified. ;Potential drug abuse or diversion identified, see note documentation. ;Assessed functional status.    Chronic Pain > 80 MEDD -             Call office for for follow up for any worsening of condition or failure to improve    Madi Hayward, APRN - CNP

## 2020-01-30 RX ORDER — PROMETHAZINE HYDROCHLORIDE 25 MG/1
TABLET ORAL
Qty: 40 TABLET | Refills: 0 | Status: SHIPPED | OUTPATIENT
Start: 2020-01-30 | End: 2020-06-25

## 2020-02-19 RX ORDER — TRAZODONE HYDROCHLORIDE 100 MG/1
TABLET ORAL
Qty: 30 TABLET | Refills: 5 | Status: SHIPPED | OUTPATIENT
Start: 2020-02-19 | End: 2020-03-11

## 2020-03-11 RX ORDER — TRAZODONE HYDROCHLORIDE 100 MG/1
TABLET ORAL
Qty: 30 TABLET | Refills: 0 | Status: SHIPPED | OUTPATIENT
Start: 2020-03-11 | End: 2020-10-04

## 2020-03-11 NOTE — TELEPHONE ENCOUNTER
Last appointment: 1/8/2020  Next appointment:   Future Appointments   Date Time Provider Estefanía Estrada   4/10/2020  9:00 AM [facility]

## 2020-04-08 RX ORDER — AMANTADINE HYDROCHLORIDE 100 MG/1
CAPSULE, GELATIN COATED ORAL
Qty: 60 CAPSULE | Refills: 1 | Status: SHIPPED | OUTPATIENT
Start: 2020-04-08 | End: 2020-06-09

## 2020-04-10 ENCOUNTER — VIRTUAL VISIT (OUTPATIENT)
Dept: FAMILY MEDICINE CLINIC | Age: 65
End: 2020-04-10
Payer: COMMERCIAL

## 2020-04-10 VITALS
DIASTOLIC BLOOD PRESSURE: 83 MMHG | TEMPERATURE: 97.4 F | OXYGEN SATURATION: 98 % | HEART RATE: 85 BPM | SYSTOLIC BLOOD PRESSURE: 111 MMHG

## 2020-04-10 PROCEDURE — 99213 OFFICE O/P EST LOW 20 MIN: CPT | Performed by: FAMILY MEDICINE

## 2020-04-10 RX ORDER — OXYCODONE HYDROCHLORIDE AND ACETAMINOPHEN 5; 325 MG/1; MG/1
TABLET ORAL
COMMUNITY
Start: 2020-03-16

## 2020-04-10 SDOH — ECONOMIC STABILITY: FOOD INSECURITY: WITHIN THE PAST 12 MONTHS, THE FOOD YOU BOUGHT JUST DIDN'T LAST AND YOU DIDN'T HAVE MONEY TO GET MORE.: NEVER TRUE

## 2020-04-10 SDOH — ECONOMIC STABILITY: INCOME INSECURITY: HOW HARD IS IT FOR YOU TO PAY FOR THE VERY BASICS LIKE FOOD, HOUSING, MEDICAL CARE, AND HEATING?: NOT HARD AT ALL

## 2020-04-10 SDOH — ECONOMIC STABILITY: TRANSPORTATION INSECURITY
IN THE PAST 12 MONTHS, HAS THE LACK OF TRANSPORTATION KEPT YOU FROM MEDICAL APPOINTMENTS OR FROM GETTING MEDICATIONS?: NO

## 2020-04-10 SDOH — ECONOMIC STABILITY: TRANSPORTATION INSECURITY
IN THE PAST 12 MONTHS, HAS LACK OF TRANSPORTATION KEPT YOU FROM MEETINGS, WORK, OR FROM GETTING THINGS NEEDED FOR DAILY LIVING?: NO

## 2020-04-10 SDOH — ECONOMIC STABILITY: FOOD INSECURITY: WITHIN THE PAST 12 MONTHS, YOU WORRIED THAT YOUR FOOD WOULD RUN OUT BEFORE YOU GOT MONEY TO BUY MORE.: NEVER TRUE

## 2020-04-10 ASSESSMENT — ENCOUNTER SYMPTOMS
SHORTNESS OF BREATH: 0
ORTHOPNEA: 0
BLURRED VISION: 0

## 2020-04-10 ASSESSMENT — PATIENT HEALTH QUESTIONNAIRE - PHQ9
1. LITTLE INTEREST OR PLEASURE IN DOING THINGS: 0
SUM OF ALL RESPONSES TO PHQ QUESTIONS 1-9: 0
SUM OF ALL RESPONSES TO PHQ QUESTIONS 1-9: 0
SUM OF ALL RESPONSES TO PHQ9 QUESTIONS 1 & 2: 0
2. FEELING DOWN, DEPRESSED OR HOPELESS: 0

## 2020-04-10 NOTE — ASSESSMENT & PLAN NOTE
Stable with current plan--will refill  Controlled Substance Monitoring:    Acute and Chronic Pain Monitoring:   RX Monitoring 7/3/2019   Attestation -   Periodic Controlled Substance Monitoring Possible medication side effects, risk of tolerance/dependence & alternative treatments discussed. ;No signs of potential drug abuse or diversion identified. ;Potential drug abuse or diversion identified, see note documentation. ;Assessed functional status.    Chronic Pain > 80 MEDD -

## 2020-04-10 NOTE — PROGRESS NOTES
Take 10 mg by mouth      Cholecalciferol 2000 UNITS CAPS Take by mouth      docusate sodium (COLACE) 100 MG capsule Take 100 mg by mouth      famotidine (PEPCID) 40 MG tablet Take 40 mg by mouth      melatonin 5 MG TABS tablet Take 5 mg by mouth daily      HYDROcodone-acetaminophen (NORCO) 7.5-325 MG per tablet Take 1 tablet by mouth every 6 hours as needed for Pain      gabapentin (NEURONTIN) 800 MG tablet Take 800 mg by mouth 4 times daily       chlorzoxazone (PARAFON FORTE) 750 MG TABS tablet Take 750 mg by mouth       No current facility-administered medications for this visit. Past Medical History:   Diagnosis Date    Chronic pain syndrome     lumbar spine--sees Dr. Oswaldo Zaidi    Hypertension     Insomnia     Tachycardia        Past Surgical History:   Procedure Laterality Date    COLONOSCOPY  07/27/2018    neg q 10    LUMBAR LAMINECTOMY  2009.2010    SPINE SURGERY  04/2016    spinal chord stimulator    UPPER GASTROINTESTINAL ENDOSCOPY  2007    ?  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[address] resource strain: Not hard at all   [employer] insecurity     Worry: Never true     Inability: Never true   [employer] needs     Medical: No     Non-medical: No   Tobacco Use    Smoking status: Never Smoker    Smokeless tobacco: Never Used   Substance and Sexual Activity    Alcohol use: No    Drug use: No    Sexual activity: Not Currently     Partners: Male   Lifestyle    Physical activity     Days per week: Not on file     Minutes per session: Not on file    Stress: Not on file   Relationships    Social connections     Talks on phone: Not on file     Gets together: Not on file     Attends [employer] service: Not on file     Active member of club or visit that was performed with the originating site at Patient Location: ____home_______  and Provider Location of: office__________Verbal consent to participate in video visit was obtained. Pursuant to the emergency declaration under the [address] [place] [address], [facility] waiver authority and the [employer] Act, this Virtual Visit was conducted, with patient's consent, to reduce the patient's risk of exposure to COVID-19 and provide continuity of care for an established/new patient. Services were provided through a video synchronous discussion virtually to substitute for in-person clinic visit. I discussed with the patient the nature of our telehealth visits via interactive/real-time audio/video that:  - I would evaluate the patient and recommend diagnostics and treatments based on my assessment  - Our sessions are not being recorded and that personal health information is protected  - Our team would provide follow up care in person if/when the patient needs it.

## 2020-04-20 RX ORDER — ZOLPIDEM TARTRATE 10 MG/1
TABLET ORAL
Qty: 45 TABLET | Refills: 0 | Status: SHIPPED | OUTPATIENT
Start: 2020-04-20 | End: 2020-04-21

## 2020-04-21 RX ORDER — ZOLPIDEM TARTRATE 10 MG/1
TABLET ORAL
Qty: 45 TABLET | Refills: 0 | Status: SHIPPED | OUTPATIENT
Start: 2020-04-21 | End: 2020-06-24

## 2020-06-09 RX ORDER — AMANTADINE HYDROCHLORIDE 100 MG/1
CAPSULE, GELATIN COATED ORAL
Qty: 60 CAPSULE | Refills: 0 | Status: SHIPPED | OUTPATIENT
Start: 2020-06-09 | End: 2022-08-09 | Stop reason: SINTOL

## 2020-06-24 RX ORDER — ZOLPIDEM TARTRATE 10 MG/1
TABLET ORAL
Qty: 45 TABLET | Refills: 0 | Status: SHIPPED | OUTPATIENT
Start: 2020-06-24 | End: 2020-07-25

## 2020-06-25 RX ORDER — PROMETHAZINE HYDROCHLORIDE 25 MG/1
TABLET ORAL
Qty: 40 TABLET | Refills: 0 | Status: SHIPPED | OUTPATIENT
Start: 2020-06-25 | End: 2020-10-21

## 2020-07-25 RX ORDER — ZOLPIDEM TARTRATE 10 MG/1
TABLET ORAL
Qty: 45 TABLET | Refills: 0 | Status: SHIPPED | OUTPATIENT
Start: 2020-07-25 | End: 2020-08-05 | Stop reason: SDUPTHER

## 2020-08-05 ENCOUNTER — OFFICE VISIT (OUTPATIENT)
Dept: FAMILY MEDICINE CLINIC | Age: 65
End: 2020-08-05
Payer: COMMERCIAL

## 2020-08-05 VITALS
SYSTOLIC BLOOD PRESSURE: 122 MMHG | BODY MASS INDEX: 24.8 KG/M2 | OXYGEN SATURATION: 98 % | HEART RATE: 87 BPM | WEIGHT: 123 LBS | DIASTOLIC BLOOD PRESSURE: 84 MMHG | TEMPERATURE: 98 F | HEIGHT: 59 IN

## 2020-08-05 PROCEDURE — 99213 OFFICE O/P EST LOW 20 MIN: CPT | Performed by: NURSE PRACTITIONER

## 2020-08-05 RX ORDER — ZOLPIDEM TARTRATE 10 MG/1
TABLET ORAL
Qty: 45 TABLET | Refills: 0 | Status: SHIPPED | OUTPATIENT
Start: 2020-08-05 | End: 2020-08-29

## 2020-08-05 NOTE — PROGRESS NOTES
Subjective:      Patient ID: Marylou Huffman is a 63 y.o. female who presents for:      Chief Complaint   Patient presents with    Medication Refill       Neeeds ambien relil. Takes 1 to 1.5 pills for sleep. Is a night shift worker. Review of Systems   All other systems reviewed and are negative. Past Medical History:   Diagnosis Date    Chronic pain syndrome     lumbar spine--sees Dr. Ruthy Nava    Hypertension     Insomnia     Tachycardia        Past Surgical History:   Procedure Laterality Date    COLONOSCOPY  07/27/2018    neg q 10    LUMBAR LAMINECTOMY  2009.2010    SPINE SURGERY  04/2016    spinal chord stimulator    UPPER GASTROINTESTINAL ENDOSCOPY  2007    ?  gastroporesis       Family History   Problem Relation Age of Onset    Cancer Mother         breast    High Blood Pressure Mother     Alzheimer's Disease Father     High Blood Pressure Father        Allergies   Allergen Reactions    Adhesive Tape     Aspirin      GI Bleed    Sulfa Antibiotics Rash       Current Outpatient Medications   Medication Sig Dispense Refill    zolpidem (AMBIEN) 10 MG tablet TAKE 1 AND 1/2 TABLET BY MOUTH EVERY NIGHT AT BEDTIME 45 tablet 0    promethazine (PHENERGAN) 25 MG tablet TAKE ONE TABLET BY MOUTH EVERY 6 HOURS AS NEEDED 40 tablet 0    amantadine (SYMMETREL) 100 MG capsule TAKE ONE CAPSULE BY MOUTH TWICE A DAY 60 capsule 0    metoprolol tartrate (LOPRESSOR) 25 MG tablet TAKE ONE TABLET BY MOUTH TWICE A DAY 60 tablet 1    traZODone (DESYREL) 100 MG tablet TAKE ONE TABLET BY MOUTH EVERY NIGHT AT BEDTIME 30 tablet 0    lisinopril (PRINIVIL;ZESTRIL) 10 MG tablet TAKE ONE TABLET BY MOUTH DAILY 90 tablet 3    Biotin 300 MCG TABS Take by mouth      cyclobenzaprine (AMRIX) 15 MG extended release capsule Take 15 mg by mouth      ibuprofen (ADVIL;MOTRIN) 600 MG tablet Take 600 mg by mouth      cetirizine (ZYRTEC) 10 MG tablet Take 10 mg by mouth      Cholecalciferol 2000 UNITS CAPS Take by mouth      docusate sodium (COLACE) 100 MG capsule Take 100 mg by mouth      famotidine (PEPCID) 40 MG tablet Take 40 mg by mouth      melatonin 5 MG TABS tablet Take 5 mg by mouth daily      HYDROcodone-acetaminophen (NORCO) 7.5-325 MG per tablet Take 1 tablet by mouth every 6 hours as needed for Pain      gabapentin (NEURONTIN) 800 MG tablet Take 800 mg by mouth 4 times daily       oxyCODONE-acetaminophen (PERCOCET) 5-325 MG per tablet       chlorzoxazone (PARAFON FORTE) 750 MG TABS tablet Take 750 mg by mouth       No current facility-administered medications for this visit. Objective:     Vitals:    08/05/20 1251   BP: 122/84   Pulse: 87   Temp: 98 °F (36.7 °C)   SpO2: 98%       Physical Exam  Constitutional:       Appearance: She is well-developed. HENT:      Head: Normocephalic. Eyes:      Pupils: Pupils are equal, round, and reactive to light. Neck:      Musculoskeletal: Normal range of motion. Cardiovascular:      Rate and Rhythm: Normal rate. Pulmonary:      Effort: Pulmonary effort is normal.   Musculoskeletal: Normal range of motion. Skin:     General: Skin is warm. Neurological:      Mental Status: She is alert and oriented to person, place, and time. Assessment & Plan:     Health Maintenance   Topic Date Due    Cervical cancer screen  04/02/2016    Potassium monitoring  06/21/2020    Creatinine monitoring  06/21/2020    Flu vaccine (1) 09/01/2020    Breast cancer screen  03/28/2021    DTaP/Tdap/Td vaccine (2 - Td) 02/02/2022    Lipid screen  08/09/2022    Colon cancer screen colonoscopy  07/27/2028    Shingles Vaccine  Completed    Hepatitis C screen  Completed    HIV screen  Completed    Hepatitis A vaccine  Aged Out    Hepatitis B vaccine  Aged Out    Hib vaccine  Aged Out    Meningococcal (ACWY) vaccine  Aged Out    Pneumococcal 0-64 years Vaccine  Aged Out        Diagnosis Orders   1.  Adjustment insomnia  zolpidem (AMBIEN) 10 MG tablet     Controlled Substance Monitoring:    Acute and Chronic Pain Monitoring:   RX Monitoring 8/5/2020   Attestation -   Periodic Controlled Substance Monitoring No signs of potential drug abuse or diversion identified. ;Assessed functional status. ;Obtaining appropriate analgesic effect of treatment. Chronic Pain > 80 MEDD -       No problem-specific Assessment & Plan notes found for this encounter.       Call office for for follow up for any worsening of condition or failure to improve    Pedro Carpio, APRN - CNP

## 2020-10-04 RX ORDER — LISINOPRIL 10 MG/1
TABLET ORAL
Qty: 30 TABLET | Refills: 2 | Status: SHIPPED | OUTPATIENT
Start: 2020-10-04

## 2020-10-21 RX ORDER — PROMETHAZINE HYDROCHLORIDE 25 MG/1
TABLET ORAL
Qty: 40 TABLET | Refills: 0 | Status: SHIPPED | OUTPATIENT
Start: 2020-10-21 | End: 2021-03-01

## 2020-10-28 RX ORDER — ZOLPIDEM TARTRATE 10 MG/1
TABLET ORAL
Qty: 45 TABLET | Refills: 0 | Status: SHIPPED | OUTPATIENT
Start: 2020-10-28 | End: 2020-11-30

## 2020-12-29 ENCOUNTER — OFFICE VISIT (OUTPATIENT)
Dept: FAMILY MEDICINE CLINIC | Age: 65
End: 2020-12-29
Payer: COMMERCIAL

## 2020-12-29 VITALS
WEIGHT: 122.2 LBS | OXYGEN SATURATION: 99 % | DIASTOLIC BLOOD PRESSURE: 80 MMHG | BODY MASS INDEX: 24.64 KG/M2 | HEIGHT: 59 IN | SYSTOLIC BLOOD PRESSURE: 130 MMHG | HEART RATE: 83 BPM | TEMPERATURE: 96.8 F

## 2020-12-29 PROCEDURE — 99213 OFFICE O/P EST LOW 20 MIN: CPT | Performed by: FAMILY MEDICINE

## 2020-12-29 ASSESSMENT — PATIENT HEALTH QUESTIONNAIRE - PHQ9
2. FEELING DOWN, DEPRESSED OR HOPELESS: 0
SUM OF ALL RESPONSES TO PHQ QUESTIONS 1-9: 0
SUM OF ALL RESPONSES TO PHQ QUESTIONS 1-9: 0
SUM OF ALL RESPONSES TO PHQ9 QUESTIONS 1 & 2: 0
SUM OF ALL RESPONSES TO PHQ QUESTIONS 1-9: 0
1. LITTLE INTEREST OR PLEASURE IN DOING THINGS: 0

## 2020-12-29 NOTE — PROGRESS NOTES
Subjective:     Patient ID:Carmen Mccarty is a 63 y.o. female. HPI   Insomnia:  Current treatment: prescription sleep aid- Ambien- 10, which has been effective. Medication side effects: none. Allergies   Allergen Reactions    Adhesive Tape     Aspirin      GI Bleed    Sulfa Antibiotics Rash       Current Outpatient Medications   Medication Sig Dispense Refill    zolpidem (AMBIEN) 10 MG tablet TAKE 1 AND 1/2 TABLET BY MOUTH EVERY NIGHT AT BEDTIME 45 tablet 0    promethazine (PHENERGAN) 25 MG tablet TAKE ONE TABLET BY MOUTH EVERY 6 HOURS AS NEEDED 40 tablet 0    traZODone (DESYREL) 100 MG tablet TAKE ONE TABLET BY MOUTH EVERY NIGHT AT BEDTIME 30 tablet 3    lisinopril (PRINIVIL;ZESTRIL) 10 MG tablet TAKE ONE TABLET BY MOUTH DAILY 30 tablet 2    amantadine (SYMMETREL) 100 MG capsule TAKE ONE CAPSULE BY MOUTH TWICE A DAY 60 capsule 0    metoprolol tartrate (LOPRESSOR) 25 MG tablet TAKE ONE TABLET BY MOUTH TWICE A DAY 60 tablet 1    oxyCODONE-acetaminophen (PERCOCET) 5-325 MG per tablet       Biotin 300 MCG TABS Take by mouth      cyclobenzaprine (AMRIX) 15 MG extended release capsule Take 15 mg by mouth      chlorzoxazone (PARAFON FORTE) 750 MG TABS tablet Take 750 mg by mouth      ibuprofen (ADVIL;MOTRIN) 600 MG tablet Take 600 mg by mouth      cetirizine (ZYRTEC) 10 MG tablet Take 10 mg by mouth      Cholecalciferol 2000 UNITS CAPS Take by mouth      docusate sodium (COLACE) 100 MG capsule Take 100 mg by mouth      famotidine (PEPCID) 40 MG tablet Take 40 mg by mouth      melatonin 5 MG TABS tablet Take 5 mg by mouth daily      HYDROcodone-acetaminophen (NORCO) 7.5-325 MG per tablet Take 1 tablet by mouth every 6 hours as needed for Pain      gabapentin (NEURONTIN) 800 MG tablet Take 800 mg by mouth 4 times daily        No current facility-administered medications for this visit.         Past Medical History:   Diagnosis Date    Chronic pain syndrome     lumbar spine--sees Dr. Vanessa James Influenza Vaccine, unspecified formulation 10/10/2016    Influenza Virus Vaccine 09/12/2011, 10/30/2019    Tdap (Boostrix, Adacel) 02/02/2012    Zoster Live (Zostavax) 10/10/2016    Zoster Recombinant (Shingrix) 10/30/2019       Review of Systems  Review of Systems    Objective:   Physical Exam  Physical Exam  Vitals signs reviewed. Constitutional:       General: She is not in acute distress. Appearance: She is well-developed. Eyes:      Conjunctiva/sclera: Conjunctivae normal.      Pupils: Pupils are equal, round, and reactive to light. Neck:      Thyroid: No thyromegaly. Vascular: No JVD. Trachea: No tracheal deviation. Cardiovascular:      Rate and Rhythm: Normal rate and regular rhythm. Heart sounds: Normal heart sounds. No murmur. No gallop. Pulmonary:      Effort: Pulmonary effort is normal. No respiratory distress. Breath sounds: Normal breath sounds. No stridor. No wheezing or rales. Chest:      Chest wall: No tenderness. Abdominal:      General: Bowel sounds are normal. There is no distension. Palpations: Abdomen is soft. There is no mass. Tenderness: There is no abdominal tenderness. Musculoskeletal:         General: No tenderness. Lymphadenopathy:      Cervical: No cervical adenopathy. Skin:     General: Skin is warm and dry. Coloration: Skin is not pale. Findings: No erythema or rash. Neurological:      Mental Status: She is alert and oriented to person, place, and time. Cranial Nerves: No cranial nerve deficit. Motor: No abnormal muscle tone. Coordination: Coordination normal.      Deep Tendon Reflexes: Reflexes normal.   Psychiatric:         Behavior: Behavior normal.         Thought Content:  Thought content normal.         Judgment: Judgment normal.         Assessment and Plan:     Lumbosacral radiculopathy  Still bad and worsening--considering [facility]FP    Insomnia  Stable with current plan-    Controlled Substance Monitoring:    Acute and Chronic Pain Monitoring:   RX Monitoring 12/29/2020   Attestation -   Periodic Controlled Substance Monitoring Possible medication side effects, risk of tolerance/dependence & alternative treatments discussed. ;Potential drug abuse or diversion identified, see note documentation. ;Assessed functional status. Chronic Pain > 50 MEDD Obtained or confirmed \"Consent for Opioid Use\" on file.    Chronic Pain > 80 MEDD -

## 2020-12-29 NOTE — ASSESSMENT & PLAN NOTE
Stable with current plan-    Controlled Substance Monitoring:    Acute and Chronic Pain Monitoring:   RX Monitoring 12/29/2020   Attestation -   Periodic Controlled Substance Monitoring Possible medication side effects, risk of tolerance/dependence & alternative treatments discussed. ;Potential drug abuse or diversion identified, see note documentation. ;Assessed functional status. Chronic Pain > 50 MEDD Obtained or confirmed \"Consent for Opioid Use\" on file.    Chronic Pain > 80 MEDD -

## 2021-02-03 DIAGNOSIS — F51.02 ADJUSTMENT INSOMNIA: ICD-10-CM

## 2021-02-03 RX ORDER — TRAZODONE HYDROCHLORIDE 100 MG/1
TABLET ORAL
Qty: 30 TABLET | Refills: 2 | Status: SHIPPED | OUTPATIENT
Start: 2021-02-03 | End: 2021-02-05

## 2021-02-04 DIAGNOSIS — F51.02 ADJUSTMENT INSOMNIA: ICD-10-CM

## 2021-02-05 RX ORDER — TRAZODONE HYDROCHLORIDE 100 MG/1
TABLET ORAL
Qty: 30 TABLET | Refills: 2 | Status: SHIPPED | OUTPATIENT
Start: 2021-02-05 | End: 2021-05-12

## 2021-02-11 DIAGNOSIS — F51.02 ADJUSTMENT INSOMNIA: ICD-10-CM

## 2021-02-11 RX ORDER — ZOLPIDEM TARTRATE 10 MG/1
TABLET ORAL
Qty: 45 TABLET | Refills: 0 | Status: SHIPPED | OUTPATIENT
Start: 2021-02-11 | End: 2021-03-13

## 2021-03-01 RX ORDER — PROMETHAZINE HYDROCHLORIDE 25 MG/1
TABLET ORAL
Qty: 40 TABLET | Refills: 0 | Status: SHIPPED | OUTPATIENT
Start: 2021-03-01 | End: 2021-06-21 | Stop reason: SDUPTHER

## 2021-03-30 ENCOUNTER — TELEMEDICINE (OUTPATIENT)
Dept: FAMILY MEDICINE CLINIC | Age: 66
End: 2021-03-30
Payer: COMMERCIAL

## 2021-03-30 DIAGNOSIS — M79.10 MYALGIA: ICD-10-CM

## 2021-03-30 DIAGNOSIS — I10 ESSENTIAL HYPERTENSION: ICD-10-CM

## 2021-03-30 DIAGNOSIS — F51.02 ADJUSTMENT INSOMNIA: Primary | ICD-10-CM

## 2021-03-30 DIAGNOSIS — Z12.31 ENCOUNTER FOR SCREENING MAMMOGRAM FOR MALIGNANT NEOPLASM OF BREAST: ICD-10-CM

## 2021-03-30 DIAGNOSIS — R53.83 FATIGUE, UNSPECIFIED TYPE: ICD-10-CM

## 2021-03-30 DIAGNOSIS — G89.4 CHRONIC PAIN SYNDROME: ICD-10-CM

## 2021-03-30 PROCEDURE — 99214 OFFICE O/P EST MOD 30 MIN: CPT | Performed by: NURSE PRACTITIONER

## 2021-03-30 RX ORDER — ZOLPIDEM TARTRATE 10 MG/1
TABLET ORAL
Qty: 45 TABLET | Refills: 0 | Status: SHIPPED | OUTPATIENT
Start: 2021-03-30 | End: 2021-04-12

## 2021-03-30 ASSESSMENT — PATIENT HEALTH QUESTIONNAIRE - PHQ9
SUM OF ALL RESPONSES TO PHQ QUESTIONS 1-9: 0
1. LITTLE INTEREST OR PLEASURE IN DOING THINGS: 0

## 2021-03-30 NOTE — PROGRESS NOTES
3/30/2021    TELEHEALTH EVALUATION -- Audio/Visual (During HVOGU-43 public health emergency)    HPI:    Julien Lanier (:  1955) has requested an audio/video evaluation for the following concern(s):    Medication reefill    Review of Systems   Musculoskeletal: Positive for myalgias. Post night shift leg cramps   Psychiatric/Behavioral: Positive for sleep disturbance. Long term use of ambien for post  shift work sleep   All other systems reviewed and are negative. Prior to Visit Medications    Medication Sig Taking?  Authorizing Provider   zolpidem (AMBIEN) 10 MG tablet Take at bedtime as needed Yes CINDY Cha - CNP   promethazine (PHENERGAN) 25 MG tablet TAKE ONE TABLET BY MOUTH EVERY 6 HOURS AS NEEDED Yes Karla Yan MD   traZODone (DESYREL) 100 MG tablet TAKE ONE TABLET BY MOUTH EVERY NIGHT AT BEDTIME Yes Karla Yan MD   lisinopril (PRINIVIL;ZESTRIL) 10 MG tablet TAKE ONE TABLET BY MOUTH DAILY Yes Karla Yan MD   amantadine (SYMMETREL) 100 MG capsule TAKE ONE CAPSULE BY MOUTH TWICE A DAY Yes Karla Yan MD   metoprolol tartrate (LOPRESSOR) 25 MG tablet TAKE ONE TABLET BY MOUTH TWICE A DAY Yes Karla Yan MD   oxyCODONE-acetaminophen (PERCOCET) 5-325 MG per tablet  Yes Historical Provider, MD   Biotin 300 MCG TABS Take by mouth Yes Historical Provider, MD   cyclobenzaprine (AMRIX) 15 MG extended release capsule Take 15 mg by mouth Yes Historical Provider, MD   chlorzoxazone (PARAFON FORTE) 750 MG TABS tablet Take 750 mg by mouth Yes Historical Provider, MD   ibuprofen (ADVIL;MOTRIN) 600 MG tablet Take 600 mg by mouth Yes Historical Provider, MD   cetirizine (ZYRTEC) 10 MG tablet Take 10 mg by mouth Yes Historical Provider, MD   Cholecalciferol 2000 UNITS CAPS Take by mouth Yes Historical Provider, MD   docusate sodium (COLACE) 100 MG capsule Take 100 mg by mouth Yes Historical Provider, MD   famotidine (PEPCID) 40 MG tablet Take 40 mg by mouth Yes Historical Provider, MD   melatonin 5 MG TABS tablet Take 5 mg by mouth daily Yes Historical Provider, MD   HYDROcodone-acetaminophen (Lafrances Eagles) 7.5-325 MG per tablet Take 1 tablet by mouth every 6 hours as needed for Pain Yes Historical Provider, MD   gabapentin (NEURONTIN) 800 MG tablet Take 800 mg by mouth 4 times daily  Yes Historical Provider, MD       Social History     Tobacco Use    Smoking status: Never Smoker    Smokeless tobacco: Never Used   Substance Use Topics    Alcohol use: No    Drug use: No        Past Medical History:   Diagnosis Date    Chronic pain syndrome     lumbar spine--sees Dr. David Barton    Hypertension     Insomnia     Tachycardia        Past Surgical History:   Procedure Laterality Date    COLONOSCOPY  07/27/2018    neg q 10    LUMBAR LAMINECTOMY  2009.2010    SPINE SURGERY  04/2016    spinal chord stimulator    UPPER GASTROINTESTINAL ENDOSCOPY  2007    ? gastroporesis       PHYSICAL EXAMINATION:  [ INSTRUCTIONS:  \"[x]\" Indicates a positive item  \"[]\" Indicates a negative item  -- DELETE ALL ITEMS NOT EXAMINED]  Vital Signs: (As obtained by patient/caregiver or practitioner observation)    Blood pressure-  Heart rate-    Respiratory rate-    Temperature-  Pulse oximetry-     Constitutional: [x] Appears well-developed and well-nourished [x] No apparent distress      [] Abnormal-   Mental status  [x] Alert and awake  [x] Oriented to person/place/time [x]Able to follow commands      Eyes:  EOM    [x]  Normal  [] Abnormal-  Sclera  [x]  Normal  [] Abnormal -         Discharge [x]  None visible  [] Abnormal -    HENT:   [x] Normocephalic, atraumatic.   [] Abnormal   [] Mouth/Throat: Mucous membranes are moist.     External Ears [] Normal  [] Abnormal-     Neck: [x] No visualized mass     Pulmonary/Chest: [x] Respiratory effort normal.  [x] No visualized signs of difficulty breathing or respiratory distress        [] Abnormal-      Musculoskeletal:   [] Normal gait with no signs of ataxia         [] Normal range of motion of neck        [] Abnormal-       Neurological:        [x] No Facial Asymmetry (Cranial nerve 7 motor function) (limited exam to video visit)          [x] No gaze palsy        [] Abnormal-         Skin:        [x] No significant exanthematous lesions or discoloration noted on facial skin         [] Abnormal-            Psychiatric:       [x] Normal Affect [x] No Hallucinations        [] Abnormal-     Other pertinent observable physical exam findings-     ASSESSMENT/PLAN:   Diagnosis Orders   1. Adjustment insomnia  zolpidem (AMBIEN) 10 MG tablet   2. Encounter for screening mammogram for malignant neoplasm of breast  ARNOLDO DIGITAL SCREEN W OR WO CAD BILATERAL   3. Essential hypertension  BASIC METABOLIC PANEL    CBC WITH AUTO DIFFERENTIAL   4. Chronic pain syndrome     5. Myalgia  LIPID PANEL   6. Fatigue, unspecified type  TSH WITH REFLEX TO FT4     Myalgia  Suspect electrolyte as cause  Labs after work shift  Try gatorade through the night    Insomnia  Controlled Substance Monitoring:    Acute and Chronic Pain Monitoring:   RX Monitoring 3/30/2021   Attestation -   Periodic Controlled Substance Monitoring Possible medication side effects, risk of tolerance/dependence & alternative treatments discussed. ;No signs of potential drug abuse or diversion identified. ;Potential drug abuse or diversion identified, see note documentation.;Obtaining appropriate analgesic effect of treatment. Chronic Pain > 50 MEDD -   Chronic Pain > 80 MEDD -       refill    Fatigue  Chronic  Labs    No follow-ups on file. Marrie Ormond is a 61 y.o. female being evaluated by a Virtual Visit (video visit) encounter to address concerns as mentioned above. A caregiver was present when appropriate.  Due to this being a TeleHealth encounter (During USTJV-23 public health emergency), evaluation of the following organ systems was limited: Vitals/Constitutional/EENT/Resp/CV/GI//MS/Neuro/Skin/Heme-Lymph-Imm. Pursuant to the emergency declaration under the [address] [place] [address], [address] [place] [address] authority and the [employer] and [employer] Act, this Virtual Visit was conducted with patient's (and/or legal guardian's) consent, to reduce the patient's risk of exposure to COVID-19 and provide necessary medical care. The patient (and/or legal guardian) has also been advised to contact this office for worsening conditions or problems, and seek emergency medical treatment and/or call 911 if deemed necessary. Patient identification was verified at the start of the visit: yes    Total time spent on this encounter: not billed by time    Services were provided through a video synchronous discussion virtually to substitute for in-person clinic visit. Patient and provider were located at their individual homes. --CINDY Constantino CNP on 3/30/2021 at 10:02 AM    An electronic signature was used to authenticate this note.

## 2021-03-30 NOTE — ASSESSMENT & PLAN NOTE
Chronic  Labs
Controlled Substance Monitoring:    Acute and Chronic Pain Monitoring:   RX Monitoring 3/30/2021   Attestation -   Periodic Controlled Substance Monitoring Possible medication side effects, risk of tolerance/dependence & alternative treatments discussed. ;No signs of potential drug abuse or diversion identified. ;Potential drug abuse or diversion identified, see note documentation.;Obtaining appropriate analgesic effect of treatment.    Chronic Pain > 50 MEDD -   Chronic Pain > 80 MEDD -       refill
Suspect electrolyte as cause  Labs after work shift  Try gatorade through the night
Statement Selected

## 2021-04-05 ENCOUNTER — TELEPHONE (OUTPATIENT)
Dept: FAMILY MEDICINE CLINIC | Age: 66
End: 2021-04-05

## 2021-04-05 NOTE — TELEPHONE ENCOUNTER
----- Message from Segun Suarez sent at 4/2/2021  2:27 PM EDT -----  Subject: Referral Request    QUESTIONS   Reason for referral request? Blood work   Has the physician seen you for this condition before? No   Preferred Specialist (if applicable)? Do you already have an appointment scheduled? No  Additional Information for Provider? Patient would like a referral on what   blood work she needs done and sent over to the [facility] (2-).   ---------------------------------------------------------------------------  --------------  [address]  What is the best way for the office to contact you? OK to leave message on   voicemail  Preferred Call Back Phone Number?  [phone]

## 2021-04-12 DIAGNOSIS — F51.02 ADJUSTMENT INSOMNIA: ICD-10-CM

## 2021-04-12 RX ORDER — ZOLPIDEM TARTRATE 10 MG/1
TABLET ORAL
Qty: 45 TABLET | Refills: 0 | Status: SHIPPED | OUTPATIENT
Start: 2021-04-12 | End: 2021-05-14

## 2021-05-12 DIAGNOSIS — F51.02 ADJUSTMENT INSOMNIA: ICD-10-CM

## 2021-05-12 RX ORDER — TRAZODONE HYDROCHLORIDE 100 MG/1
TABLET ORAL
Qty: 30 TABLET | Refills: 1 | Status: SHIPPED | OUTPATIENT
Start: 2021-05-12 | End: 2021-10-14

## 2021-06-14 DIAGNOSIS — F51.02 ADJUSTMENT INSOMNIA: ICD-10-CM

## 2021-06-14 RX ORDER — ZOLPIDEM TARTRATE 10 MG/1
TABLET ORAL
Qty: 45 TABLET | Refills: 0 | Status: SHIPPED | OUTPATIENT
Start: 2021-06-14 | End: 2021-07-18

## 2021-06-14 NOTE — TELEPHONE ENCOUNTER
----- Message from Brooklynn Perales sent at 6/14/2021  1:14 PM EDT -----  Subject: Refill Request    QUESTIONS  Name of Medication? zolpidem (AMBIEN) 10 MG tablet  Patient-reported dosage and instructions? 10 mg 1 and 1/2 at bedtime  How many days do you have left? 6  Preferred Pharmacy? [facility]  Pharmacy phone number (if available)? [phone]  ---------------------------------------------------------------------------  --------------  Juan Carlos NEWMAN  What is the best way for the office to contact you? OK to leave message on   voicemail  Preferred Call Back Phone Number?  [phone]

## 2021-06-21 ENCOUNTER — VIRTUAL VISIT (OUTPATIENT)
Dept: FAMILY MEDICINE CLINIC | Age: 66
End: 2021-06-21
Payer: COMMERCIAL

## 2021-06-21 DIAGNOSIS — F51.02 ADJUSTMENT INSOMNIA: ICD-10-CM

## 2021-06-21 PROCEDURE — 99213 OFFICE O/P EST LOW 20 MIN: CPT | Performed by: FAMILY MEDICINE

## 2021-06-21 RX ORDER — PROMETHAZINE HYDROCHLORIDE 25 MG/1
TABLET ORAL
Qty: 40 TABLET | Refills: 3 | Status: SHIPPED | OUTPATIENT
Start: 2021-06-21 | End: 2022-06-16

## 2021-06-21 NOTE — PROGRESS NOTES
Mirza Reveles (:  1955) is a 61 y.o. female,Established patient, here for evaluation of the following chief complaint(s): Medication Refill         ASSESSMENT/PLAN:  1. Adjustment insomnia  Assessment & Plan:   Well-controlled, continue current medications   Controlled Substance Monitoring:    Acute and Chronic Pain Monitoring:   RX Monitoring 2021   Attestation -   Periodic Controlled Substance Monitoring Possible medication side effects, risk of tolerance/dependence & alternative treatments discussed. ;No signs of potential drug abuse or diversion identified. ;Assessed functional status. Chronic Pain > 50 MEDD -   Chronic Pain > 80 MEDD -             No follow-ups on file. SUBJECTIVE/OBJECTIVE:  HPI   Insomnia:  Current treatment: prescription sleep aid- Ambien- 10, which has been effective. Medication side effects: none. Review of Systems    Patient-Reported Vitals 3/30/2021   Patient-Reported Weight 120 lbs        Physical Exam              Mirza Reveles, was evaluated through a synchronous (real-time) audio-video encounter. The patient (or guardian if applicable) is aware that this is a billable service. Verbal consent to proceed has been obtained within the past 12 months. The visit was conducted pursuant to the emergency declaration under the [address] [place] [address], [address] [place] [address] authority and the [employer] and [employer] Act. Patient identification was verified, and a caregiver was present when appropriate. The patient was located in a state where the provider was credentialed to provide care. An electronic signature was used to authenticate this note.     --Giovanni Estrada MD

## 2021-06-21 NOTE — ASSESSMENT & PLAN NOTE
Well-controlled, continue current medications   Controlled Substance Monitoring:    Acute and Chronic Pain Monitoring:   RX Monitoring 6/21/2021   Attestation -   Periodic Controlled Substance Monitoring Possible medication side effects, risk of tolerance/dependence & alternative treatments discussed. ;No signs of potential drug abuse or diversion identified. ;Assessed functional status.    Chronic Pain > 50 MEDD -   Chronic Pain > 80 MEDD -

## 2021-07-17 DIAGNOSIS — F51.02 ADJUSTMENT INSOMNIA: ICD-10-CM

## 2021-07-18 RX ORDER — ZOLPIDEM TARTRATE 10 MG/1
TABLET ORAL
Qty: 45 TABLET | Refills: 0 | Status: SHIPPED | OUTPATIENT
Start: 2021-07-18 | End: 2021-08-19

## 2021-08-19 DIAGNOSIS — F51.02 ADJUSTMENT INSOMNIA: ICD-10-CM

## 2021-08-19 RX ORDER — ZOLPIDEM TARTRATE 10 MG/1
TABLET ORAL
Qty: 45 TABLET | Refills: 0 | Status: SHIPPED | OUTPATIENT
Start: 2021-08-19 | End: 2021-09-21 | Stop reason: SDUPTHER

## 2021-09-15 ENCOUNTER — OFFICE VISIT (OUTPATIENT)
Dept: FAMILY MEDICINE CLINIC | Age: 66
End: 2021-09-15
Payer: COMMERCIAL

## 2021-09-15 VITALS
SYSTOLIC BLOOD PRESSURE: 120 MMHG | OXYGEN SATURATION: 97 % | HEART RATE: 84 BPM | WEIGHT: 126 LBS | HEIGHT: 59 IN | DIASTOLIC BLOOD PRESSURE: 80 MMHG | BODY MASS INDEX: 25.4 KG/M2

## 2021-09-15 DIAGNOSIS — R53.83 FATIGUE, UNSPECIFIED TYPE: ICD-10-CM

## 2021-09-15 DIAGNOSIS — F51.02 ADJUSTMENT INSOMNIA: ICD-10-CM

## 2021-09-15 PROCEDURE — 99212 OFFICE O/P EST SF 10 MIN: CPT | Performed by: FAMILY MEDICINE

## 2021-09-15 RX ORDER — DULOXETIN HYDROCHLORIDE 30 MG/1
30 CAPSULE, DELAYED RELEASE ORAL DAILY
COMMUNITY

## 2021-09-15 SDOH — ECONOMIC STABILITY: FOOD INSECURITY: WITHIN THE PAST 12 MONTHS, THE FOOD YOU BOUGHT JUST DIDN'T LAST AND YOU DIDN'T HAVE MONEY TO GET MORE.: NEVER TRUE

## 2021-09-15 SDOH — ECONOMIC STABILITY: FOOD INSECURITY: WITHIN THE PAST 12 MONTHS, YOU WORRIED THAT YOUR FOOD WOULD RUN OUT BEFORE YOU GOT MONEY TO BUY MORE.: NEVER TRUE

## 2021-09-15 ASSESSMENT — PATIENT HEALTH QUESTIONNAIRE - PHQ9
SUM OF ALL RESPONSES TO PHQ QUESTIONS 1-9: 0
1. LITTLE INTEREST OR PLEASURE IN DOING THINGS: 0
2. FEELING DOWN, DEPRESSED OR HOPELESS: 0
SUM OF ALL RESPONSES TO PHQ QUESTIONS 1-9: 0
SUM OF ALL RESPONSES TO PHQ9 QUESTIONS 1 & 2: 0
SUM OF ALL RESPONSES TO PHQ QUESTIONS 1-9: 0

## 2021-09-15 ASSESSMENT — ENCOUNTER SYMPTOMS
SWOLLEN GLANDS: 0
SORE THROAT: 0
VOMITING: 0
VISUAL CHANGE: 0
NAUSEA: 0
CHANGE IN BOWEL HABIT: 0
COUGH: 0
ABDOMINAL PAIN: 0

## 2021-09-15 ASSESSMENT — SOCIAL DETERMINANTS OF HEALTH (SDOH): HOW HARD IS IT FOR YOU TO PAY FOR THE VERY BASICS LIKE FOOD, HOUSING, MEDICAL CARE, AND HEATING?: NOT HARD AT ALL

## 2021-09-15 NOTE — PROGRESS NOTES
Subjective:     Patient ID:Carmen Tee is a 61 y.o. female. Fatigue  This is a recurrent problem. The current episode started more than 1 year ago. The problem occurs constantly. The problem has been gradually worsening. Associated symptoms include arthralgias and fatigue. Pertinent negatives include no abdominal pain, anorexia, change in bowel habit, chest pain, chills, congestion, coughing, diaphoresis, fever, headaches, joint swelling, myalgias, nausea, neck pain, numbness, rash, sore throat, swollen glands, urinary symptoms, vertigo, visual change, vomiting or weakness. Nothing aggravates the symptoms. She has tried nothing for the symptoms. The treatment provided no relief.        Allergies   Allergen Reactions    Adhesive Tape     Aspirin      GI Bleed    Sulfa Antibiotics Rash       Current Outpatient Medications   Medication Sig Dispense Refill    DULoxetine (CYMBALTA) 30 MG extended release capsule Take 30 mg by mouth daily      zolpidem (AMBIEN) 10 MG tablet TAKE ONE TABLET BY MOUTH DAILY BEFORE BEDTIME. MAY TAKE ANOTHER 1/2 TABLET AS NEEDED 45 tablet 0    promethazine (PHENERGAN) 25 MG tablet TAKE ONE TABLET BY MOUTH EVERY 6 HOURS AS NEEDED 40 tablet 3    traZODone (DESYREL) 100 MG tablet TAKE ONE TABLET BY MOUTH EVERY NIGHT AT BEDTIME 30 tablet 1    lisinopril (PRINIVIL;ZESTRIL) 10 MG tablet TAKE ONE TABLET BY MOUTH DAILY 30 tablet 2    amantadine (SYMMETREL) 100 MG capsule TAKE ONE CAPSULE BY MOUTH TWICE A DAY 60 capsule 0    metoprolol tartrate (LOPRESSOR) 25 MG tablet TAKE ONE TABLET BY MOUTH TWICE A DAY 60 tablet 1    oxyCODONE-acetaminophen (PERCOCET) 5-325 MG per tablet       Biotin 300 MCG TABS Take by mouth      cyclobenzaprine (AMRIX) 15 MG extended release capsule Take 15 mg by mouth      chlorzoxazone (PARAFON FORTE) 750 MG TABS tablet Take 750 mg by mouth      ibuprofen (ADVIL;MOTRIN) 600 MG tablet Take 600 mg by mouth      cetirizine (ZYRTEC) 10 MG tablet Take 10 mg by mouth      Cholecalciferol 2000 UNITS CAPS Take by mouth      docusate sodium (COLACE) 100 MG capsule Take 100 mg by mouth      famotidine (PEPCID) 40 MG tablet Take 40 mg by mouth      melatonin 5 MG TABS tablet Take 5 mg by mouth daily      HYDROcodone-acetaminophen (NORCO) 7.5-325 MG per tablet Take 1 tablet by mouth every 6 hours as needed for Pain      gabapentin (NEURONTIN) 800 MG tablet Take 800 mg by mouth 4 times daily        No current facility-administered medications for this visit. Past Medical History:   Diagnosis Date    Chronic pain syndrome     lumbar spine--sees Dr. Jamal Swain    Hypertension     Insomnia     Tachycardia        Past Surgical History:   Procedure Laterality Date    COLONOSCOPY  07/27/2018    neg q 10    LUMBAR LAMINECTOMY  2009.2010    SPINE SURGERY  04/2016    spinal chord stimulator    UPPER GASTROINTESTINAL ENDOSCOPY  2007    ? 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    Smokeless tobacco: Never Used   Substance and Sexual Activity    Alcohol use: No    Drug use: No    Sexual activity: Not Currently     Partners: Male   Other Topics Concern    Not on file   Social History Narrative    Works 36/w(nights)    Hobbies; reads    Exercise; little    + seatbelts     Social Determinants of Health     Financial Resource Strain: Low Risk     Difficulty of Paying Living Expenses: Not hard at all   Food Insecurity: No Food Insecurity    Worried About [address] in the Last Year: Never true    [facility] in the Last Year: Never true   Transportation Needs:     Lack of Transportation (Medical):      Lack of Transportation (Non-Medical):    Physical Activity:     Days of Exercise per Week:     Minutes of Exercise per Session:    Stress:     Feeling of Stress :    Social Connections:     Frequency of Communication with Friends and Family:     Frequency of Social Gatherings with Friends and Family:     Attends Restorationism Services:     Active Member of Clubs or Organizations:     Attends Club or Organization Meetings:     Marital Status:    Intimate Partner Violence:     Fear of Current or Ex-Partner:     Emotionally Abused:     Physically Abused:     Sexually Abused:        Family History   Problem Relation Age of Onset    Cancer Mother         breast    High Blood Pressure Mother     Alzheimer's Disease Father     High Blood Pressure Father        Immunization History   Administered Date(s) Administered    COVID-19, Mccartney Peter, PF, 30mcg/0.3mL 01/05/2021, 01/29/2021    Influenza Vaccine, unspecified formulation 10/10/2016    Influenza Virus Vaccine 09/12/2011, 10/30/2019    Tdap (Boostrix, Adacel) 02/02/2012    Zoster Live (Zostavax) 10/10/2016    Zoster Recombinant (Shingrix) 10/30/2019       Review of Systems  Review of Systems   Constitutional: Positive for fatigue. Negative for chills, diaphoresis and fever. HENT: Negative for congestion and sore throat. Respiratory: Negative for cough. Cardiovascular: Negative for chest pain. Gastrointestinal: Negative for abdominal pain, anorexia, change in bowel habit, nausea and vomiting. Musculoskeletal: Positive for arthralgias. Negative for joint swelling, myalgias and neck pain. Skin: Negative for rash. Neurological: Negative for vertigo, weakness, numbness and headaches. Objective:   Physical Exam  Physical Exam  Vitals reviewed. Constitutional:       General: She is not in acute distress. Appearance: She is well-developed. Eyes:      Conjunctiva/sclera: Conjunctivae normal.      Pupils: Pupils are equal, round, and reactive to light. Neck:      Thyroid: No thyromegaly. Vascular: No JVD. Trachea: No tracheal deviation. Cardiovascular:      Rate and Rhythm: Normal rate and regular rhythm. Heart sounds: Normal heart sounds. No murmur heard. No gallop. Pulmonary:      Effort: Pulmonary effort is normal. No respiratory distress. Breath sounds: Normal breath sounds. No stridor. No wheezing or rales. Chest:      Chest wall: No tenderness. Abdominal:      General: Bowel sounds are normal. There is no distension. Palpations: Abdomen is soft. There is no mass. Tenderness: There is no abdominal tenderness. Musculoskeletal:         General: No tenderness. Lymphadenopathy:      Cervical: No cervical adenopathy. Skin:     General: Skin is warm and dry. Coloration: Skin is not pale. Findings: No erythema or rash. Neurological:      Mental Status: She is alert and oriented to person, place, and time. Cranial Nerves: No cranial nerve deficit. Motor: No abnormal muscle tone. Coordination: Coordination normal.      Deep Tendon Reflexes: Reflexes normal.   Psychiatric:         Behavior: Behavior normal.         Thought Content: Thought content normal.         Judgment: Judgment normal.         Assessment and Plan:     Insomnia   Well-controlled, continue current medications--discussed SE--  Controlled Substance Monitoring:    Acute and Chronic Pain Monitoring:   RX Monitoring 9/15/2021   Attestation -   Periodic Controlled Substance Monitoring Possible medication side effects, risk of tolerance/dependence & alternative treatments discussed. ;No signs of potential drug abuse or diversion identified. ;Assessed functional status.    Chronic Pain > 50 MEDD -   Chronic Pain > 80 MEDD -           Fatigue   Get labs

## 2021-09-15 NOTE — ASSESSMENT & PLAN NOTE
Well-controlled, continue current medications--discussed SE--  Controlled Substance Monitoring:    Acute and Chronic Pain Monitoring:   RX Monitoring 9/15/2021   Attestation -   Periodic Controlled Substance Monitoring Possible medication side effects, risk of tolerance/dependence & alternative treatments discussed. ;No signs of potential drug abuse or diversion identified. ;Assessed functional status.    Chronic Pain > 50 MEDD -   Chronic Pain > 80 MEDD -

## 2021-09-21 DIAGNOSIS — F51.02 ADJUSTMENT INSOMNIA: ICD-10-CM

## 2021-09-21 RX ORDER — ZOLPIDEM TARTRATE 10 MG/1
TABLET ORAL
Qty: 45 TABLET | Refills: 0 | Status: SHIPPED | OUTPATIENT
Start: 2021-09-21 | End: 2021-10-19

## 2021-09-21 NOTE — TELEPHONE ENCOUNTER
Please refill rx below:    zolpidem [facility] [place]) 10 MG tablet       Pharmacy    [address],  [address]   [address] [place] [address], [facility]   Phone:  [phone]  Fax:  [phone]         LOV: 9/15/21

## 2021-10-18 DIAGNOSIS — F51.02 ADJUSTMENT INSOMNIA: ICD-10-CM

## 2021-10-19 RX ORDER — ZOLPIDEM TARTRATE 10 MG/1
TABLET ORAL
Qty: 45 TABLET | Refills: 0 | Status: SHIPPED | OUTPATIENT
Start: 2021-10-19 | End: 2021-11-22 | Stop reason: SDUPTHER

## 2021-10-25 DIAGNOSIS — N30.00 ACUTE CYSTITIS WITHOUT HEMATURIA: Primary | ICD-10-CM

## 2021-10-26 LAB
BILIRUBIN URINE: NEGATIVE
BLOOD, URINE: NEGATIVE
CLARITY: CLEAR
COLOR: YELLOW
EPITHELIAL CELLS, UA: 3 /HPF (ref 0–5)
GLUCOSE URINE: NEGATIVE MG/DL
HYALINE CASTS: 0 /LPF (ref 0–8)
KETONES, URINE: NEGATIVE MG/DL
LEUKOCYTE ESTERASE, URINE: NEGATIVE
MICROSCOPIC EXAMINATION: YES
NITRITE, URINE: NEGATIVE
PH UA: 6.5 (ref 5–8)
PROTEIN UA: 30 MG/DL
RBC UA: 2 /HPF (ref 0–4)
SPECIFIC GRAVITY UA: 1.01 (ref 1–1.03)
URINE TYPE: ABNORMAL
UROBILINOGEN, URINE: 0.2 E.U./DL
WBC UA: 6 /HPF (ref 0–5)

## 2021-10-27 LAB — URINE CULTURE, ROUTINE: NORMAL

## 2021-11-01 RX ORDER — METHYLPREDNISOLONE 4 MG/1
4 TABLET ORAL SEE ADMIN INSTRUCTIONS
Qty: 1 KIT | Refills: 0 | Status: SHIPPED | OUTPATIENT
Start: 2021-11-01 | End: 2021-11-07

## 2021-11-22 DIAGNOSIS — F51.02 ADJUSTMENT INSOMNIA: ICD-10-CM

## 2021-11-22 RX ORDER — ZOLPIDEM TARTRATE 10 MG/1
TABLET ORAL
Qty: 45 TABLET | Refills: 0 | Status: SHIPPED | OUTPATIENT
Start: 2021-11-22 | End: 2021-12-28 | Stop reason: SDUPTHER

## 2021-11-29 ENCOUNTER — TELEPHONE (OUTPATIENT)
Dept: FAMILY MEDICINE CLINIC | Age: 66
End: 2021-11-29

## 2021-11-29 NOTE — TELEPHONE ENCOUNTER
Pt is having pain in neck radiating down both arms. Pt's states that she has MS and hasn't had this for around 2 years and now, it is coming back. Please call pt.     LOV: 9/15/21

## 2021-12-08 ENCOUNTER — OFFICE VISIT (OUTPATIENT)
Dept: FAMILY MEDICINE CLINIC | Age: 66
End: 2021-12-08
Payer: COMMERCIAL

## 2021-12-08 VITALS
BODY MASS INDEX: 27.05 KG/M2 | WEIGHT: 134.2 LBS | SYSTOLIC BLOOD PRESSURE: 120 MMHG | HEIGHT: 59 IN | DIASTOLIC BLOOD PRESSURE: 80 MMHG | HEART RATE: 97 BPM | OXYGEN SATURATION: 98 %

## 2021-12-08 DIAGNOSIS — R31.0 GROSS HEMATURIA: Primary | ICD-10-CM

## 2021-12-08 DIAGNOSIS — F51.02 ADJUSTMENT INSOMNIA: ICD-10-CM

## 2021-12-08 PROCEDURE — 99213 OFFICE O/P EST LOW 20 MIN: CPT | Performed by: FAMILY MEDICINE

## 2021-12-08 PROCEDURE — 81000 URINALYSIS NONAUTO W/SCOPE: CPT | Performed by: FAMILY MEDICINE

## 2021-12-08 RX ORDER — CEPHALEXIN 500 MG/1
500 CAPSULE ORAL 3 TIMES DAILY
Qty: 21 CAPSULE | Refills: 0 | Status: SHIPPED | OUTPATIENT
Start: 2021-12-08 | End: 2021-12-15

## 2021-12-08 ASSESSMENT — ENCOUNTER SYMPTOMS
VOMITING: 0
FACIAL SWELLING: 0
ABDOMINAL PAIN: 0
NAUSEA: 0

## 2021-12-08 ASSESSMENT — PATIENT HEALTH QUESTIONNAIRE - PHQ9
SUM OF ALL RESPONSES TO PHQ QUESTIONS 1-9: 0
SUM OF ALL RESPONSES TO PHQ QUESTIONS 1-9: 0
SUM OF ALL RESPONSES TO PHQ9 QUESTIONS 1 & 2: 0
1. LITTLE INTEREST OR PLEASURE IN DOING THINGS: 0
SUM OF ALL RESPONSES TO PHQ QUESTIONS 1-9: 0
2. FEELING DOWN, DEPRESSED OR HOPELESS: 0

## 2021-12-08 ASSESSMENT — LIFESTYLE VARIABLES: TOBACCO_USE: 0

## 2021-12-08 NOTE — PROGRESS NOTES
750 MG TABS tablet Take 750 mg by mouth      ibuprofen (ADVIL;MOTRIN) 600 MG tablet Take 600 mg by mouth      cetirizine (ZYRTEC) 10 MG tablet Take 10 mg by mouth      Cholecalciferol 2000 UNITS CAPS Take by mouth      docusate sodium (COLACE) 100 MG capsule Take 100 mg by mouth      famotidine (PEPCID) 40 MG tablet Take 40 mg by mouth      melatonin 5 MG TABS tablet Take 5 mg by mouth daily      gabapentin (NEURONTIN) 800 MG tablet Take 800 mg by mouth 4 times daily       HYDROcodone-acetaminophen (NORCO) 7.5-325 MG per tablet Take 1 tablet by mouth every 6 hours as needed for Pain (Patient not taking: Reported on 12/8/2021)       No current facility-administered medications for this visit. Past Medical History:   Diagnosis Date    Chronic pain syndrome     lumbar spine--sees Dr. Matthew Walters    Hypertension     Insomnia     Tachycardia        Past Surgical History:   Procedure Laterality Date    COLONOSCOPY  07/27/2018    neg q 10    LUMBAR LAMINECTOMY  2009.2010    SPINE SURGERY  04/2016    spinal chord stimulator    UPPER GASTROINTESTINAL ENDOSCOPY  2007    ?  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    Smokeless tobacco: Never Used   Substance and Sexual Activity    Alcohol use: No    Drug use: No    Sexual activity: Not Currently     Partners: Male   Other Topics Concern    Not on file   Social History Narrative    Works 36/w(nights)    Hobbies; reads    Exercise; little    + seatbelts     Social Determinants of Health     Financial Resource Strain: Low Risk     Difficulty of Paying Living Expenses: Not hard at all   Food Insecurity: No Food Insecurity    Worried About [address] [facility] in the Last Year: Never true  Ran Out of Food in the Last Year: Never true   Transportation Needs:     Lack of Transportation (Medical): Not on file    Lack of Transportation (Non-Medical): Not on file   Physical Activity:     Days of Exercise per Week: Not on file    Minutes of Exercise per Session: Not on file   Stress:     Feeling of Stress : Not on file   Social Connections:     Frequency of Communication with Friends and Family: Not on file    Frequency of Social Gatherings with Friends and Family: Not on file    Attends [employer] Services: Not on file    Active Member of [address] [place] [address] [employer] or Organizations: Not on file    Attends Club or Organization Meetings: Not on file    Marital Status: Not on file   Intimate Partner Violence:     Fear of Current or Ex-Partner: Not on file    Emotionally Abused: Not on file    Physically Abused: Not on file    Sexually Abused: Not on file   Housing Stability:     Unable to Pay for Housing in the Last Year: Not on file    Number of [place] in the Last Year: Not on file    Unstable Housing in the Last Year: Not on file       Family History   Problem Relation Age of Onset    Cancer Mother         breast    High Blood Pressure Mother     Alzheimer's Disease Father     High Blood Pressure Father        Immunization History   Administered Date(s) Administered    COVID-19, Mccartney Peter, PF, 30mcg/0.3mL 01/05/2021, 01/29/2021, 10/14/2021    Influenza Vaccine, unspecified formulation 10/10/2016    Influenza Virus Vaccine 09/12/2011, 10/30/2019    Tdap (Boostrix, Adacel) 02/02/2012    Zoster Live (Zostavax) 10/10/2016    Zoster Recombinant (Shingrix) 10/30/2019       Review of Systems  Review of Systems   Constitutional: Negative for chills and fever. HENT: Negative for facial swelling. Gastrointestinal: Negative for abdominal pain, nausea and vomiting. Genitourinary: Positive for hematuria.  Negative for dysuria, flank pain, frequency, hesitancy, incomplete emptying, nocturia and urgency. Objective:   Physical Exam  Physical Exam  Abdominal:      General: Abdomen is flat. There is no distension. Palpations: Abdomen is soft. There is no mass. Tenderness: There is no abdominal tenderness. There is no right CVA tenderness, left CVA tenderness, guarding or rebound. Hernia: No hernia is present. Urine dipstick shows positive for RBC's and positive for leukocytes. Micro exam: 5 WBC's per HPF TNTC RBC    Assessment and Plan:     Insomnia   Well-controlled, continue current medications   Controlled Substance Monitoring:    Acute and Chronic Pain Monitoring:   RX Monitoring 12/8/2021   Attestation -   Periodic Controlled Substance Monitoring Possible medication side effects, risk of tolerance/dependence & alternative treatments discussed. ;No signs of potential drug abuse or diversion identified. ;Assessed functional status.    Chronic Pain > 50 MEDD -   Chronic Pain > 80 MEDD -           Gross hematuria   cx and get CT-tx expectantly with keflex

## 2021-12-08 NOTE — ASSESSMENT & PLAN NOTE
Well-controlled, continue current medications   Controlled Substance Monitoring:    Acute and Chronic Pain Monitoring:   RX Monitoring 12/8/2021   Attestation -   Periodic Controlled Substance Monitoring Possible medication side effects, risk of tolerance/dependence & alternative treatments discussed. ;No signs of potential drug abuse or diversion identified. ;Assessed functional status.    Chronic Pain > 50 MEDD -   Chronic Pain > 80 MEDD -

## 2021-12-10 LAB — URINE CULTURE, ROUTINE: NORMAL

## 2021-12-28 DIAGNOSIS — F51.02 ADJUSTMENT INSOMNIA: ICD-10-CM

## 2021-12-28 RX ORDER — ZOLPIDEM TARTRATE 10 MG/1
TABLET ORAL
Qty: 45 TABLET | Refills: 0 | Status: SHIPPED | OUTPATIENT
Start: 2021-12-28 | End: 2022-01-27 | Stop reason: SDUPTHER

## 2022-01-27 ENCOUNTER — PATIENT MESSAGE (OUTPATIENT)
Dept: FAMILY MEDICINE CLINIC | Age: 67
End: 2022-01-27

## 2022-01-27 DIAGNOSIS — F51.02 ADJUSTMENT INSOMNIA: ICD-10-CM

## 2022-01-27 RX ORDER — ZOLPIDEM TARTRATE 10 MG/1
TABLET ORAL
Qty: 45 TABLET | Refills: 0 | Status: SHIPPED | OUTPATIENT
Start: 2022-01-27 | End: 2022-03-01

## 2022-01-27 NOTE — TELEPHONE ENCOUNTER
From: Brent Hernandez  To: Dr. Maria C Holley: 1/27/2022 4:35 PM EST  Subject: Ambien    I will run out of Ambien this weekend and have no refills. I still use [facility] on [id number] [facility] [id number]. Usually I call them for the refill but they seem to have no employees. I wonder if Covid is running around the pharmacy. We had many employees out with positive Covid tests. We get tested twice weekly. I'm very careful and have remained negative. I have a Cystoscopy 2/7 and if my bladder is healed I will begin chemo. The side effects shouldn't be too bad since it goes directly into my bladder rather than an IV. I get to keep my hair. Thank you for the refill. I hope you and yours stay healthy.  Mar Velez

## 2022-02-10 LAB
BILIRUBIN URINE: NEGATIVE MG/DL
ERYTHROCYTES URINE: ABNORMAL
GLUCOSE URINE: NEGATIVE MG/DL
KETONES, URINE: ABNORMAL MG/DL
LEUKOCYTE ESTERASE, URINE: NEGATIVE
NITRITE, URINE: NEGATIVE
POC PH ARTERIAL: 7 (ref 5–8)
PROTEIN UA: 30 MG/DL
SPECIFIC GRAVITY UA: 1.02 (ref 1–1.03)
UROBILINOGEN, URINE: 0.2 EU/DL (ref 0.2–1)

## 2022-03-01 DIAGNOSIS — F51.02 ADJUSTMENT INSOMNIA: ICD-10-CM

## 2022-03-01 RX ORDER — ZOLPIDEM TARTRATE 10 MG/1
TABLET ORAL
Qty: 45 TABLET | Refills: 0 | Status: SHIPPED | OUTPATIENT
Start: 2022-03-01 | End: 2022-03-30 | Stop reason: SDUPTHER

## 2022-03-07 DIAGNOSIS — F51.02 ADJUSTMENT INSOMNIA: ICD-10-CM

## 2022-03-07 RX ORDER — TRAZODONE HYDROCHLORIDE 100 MG/1
TABLET ORAL
Qty: 30 TABLET | Refills: 3 | Status: SHIPPED | OUTPATIENT
Start: 2022-03-07 | End: 2022-06-16 | Stop reason: SDUPTHER

## 2022-03-30 DIAGNOSIS — F51.02 ADJUSTMENT INSOMNIA: ICD-10-CM

## 2022-03-30 RX ORDER — ZOLPIDEM TARTRATE 10 MG/1
TABLET ORAL
Qty: 45 TABLET | Refills: 0 | Status: SHIPPED | OUTPATIENT
Start: 2022-03-30 | End: 2022-04-29

## 2022-04-29 DIAGNOSIS — F51.02 ADJUSTMENT INSOMNIA: ICD-10-CM

## 2022-04-29 RX ORDER — ZOLPIDEM TARTRATE 10 MG/1
TABLET ORAL
Qty: 45 TABLET | Refills: 0 | Status: SHIPPED | OUTPATIENT
Start: 2022-04-29 | End: 2022-05-31 | Stop reason: SDUPTHER

## 2022-05-31 DIAGNOSIS — F51.02 ADJUSTMENT INSOMNIA: ICD-10-CM

## 2022-05-31 RX ORDER — ZOLPIDEM TARTRATE 10 MG/1
TABLET ORAL
Qty: 45 TABLET | Refills: 0 | Status: SHIPPED | OUTPATIENT
Start: 2022-05-31 | End: 2022-07-06 | Stop reason: SDUPTHER

## 2022-06-16 DIAGNOSIS — F51.02 ADJUSTMENT INSOMNIA: ICD-10-CM

## 2022-06-16 RX ORDER — PROMETHAZINE HYDROCHLORIDE 25 MG/1
TABLET ORAL
Qty: 40 TABLET | Refills: 3 | Status: SHIPPED | OUTPATIENT
Start: 2022-06-16

## 2022-06-16 RX ORDER — TRAZODONE HYDROCHLORIDE 100 MG/1
TABLET ORAL
Qty: 30 TABLET | Refills: 3 | Status: SHIPPED | OUTPATIENT
Start: 2022-06-16 | End: 2022-09-12 | Stop reason: SDUPTHER

## 2022-06-30 DIAGNOSIS — F51.02 ADJUSTMENT INSOMNIA: ICD-10-CM

## 2022-06-30 RX ORDER — ZOLPIDEM TARTRATE 10 MG/1
TABLET ORAL
Qty: 45 TABLET | OUTPATIENT
Start: 2022-06-30

## 2022-07-01 DIAGNOSIS — F51.02 ADJUSTMENT INSOMNIA: ICD-10-CM

## 2022-07-01 RX ORDER — ZOLPIDEM TARTRATE 10 MG/1
TABLET ORAL
Qty: 45 TABLET | Refills: 0 | OUTPATIENT
Start: 2022-07-01 | End: 2022-07-31

## 2022-07-01 NOTE — TELEPHONE ENCOUNTER
----- Message from Nigel Pinto sent at 7/1/2022  2:45 PM EDT -----  Subject: Refill Request    QUESTIONS  Name of Medication? zolpidem (AMBIEN) 10 MG tablet  Patient-reported dosage and instructions? 10 mg, 2 tablets as needed   How many days do you have left? 2  Preferred Pharmacy? [facility] [id number]  Pharmacy phone number (if available)? [phone]  ---------------------------------------------------------------------------  --------------  Sherice Dennise TRENT  What is the best way for the office to contact you? OK to leave message on   voicemail  Preferred Call Back Phone Number? [phone]  ---------------------------------------------------------------------------  --------------  SCRIPT ANSWERS  Relationship to Patient?  Self

## 2022-07-06 ENCOUNTER — OFFICE VISIT (OUTPATIENT)
Dept: FAMILY MEDICINE CLINIC | Age: 67
End: 2022-07-06
Payer: MEDICARE

## 2022-07-06 VITALS
DIASTOLIC BLOOD PRESSURE: 80 MMHG | OXYGEN SATURATION: 98 % | BODY MASS INDEX: 28.3 KG/M2 | SYSTOLIC BLOOD PRESSURE: 120 MMHG | HEART RATE: 117 BPM | WEIGHT: 140.4 LBS | HEIGHT: 59 IN

## 2022-07-06 DIAGNOSIS — C67.9 BLADDER CARCINOMA (HCC): ICD-10-CM

## 2022-07-06 DIAGNOSIS — F51.02 ADJUSTMENT INSOMNIA: ICD-10-CM

## 2022-07-06 PROBLEM — R31.0 GROSS HEMATURIA: Status: RESOLVED | Noted: 2021-12-08 | Resolved: 2022-07-06

## 2022-07-06 PROCEDURE — 99213 OFFICE O/P EST LOW 20 MIN: CPT | Performed by: FAMILY MEDICINE

## 2022-07-06 PROCEDURE — 1123F ACP DISCUSS/DSCN MKR DOCD: CPT | Performed by: FAMILY MEDICINE

## 2022-07-06 RX ORDER — ZOLPIDEM TARTRATE 10 MG/1
TABLET ORAL
Qty: 45 TABLET | Refills: 0 | Status: SHIPPED | OUTPATIENT
Start: 2022-07-06 | End: 2022-08-05

## 2022-07-06 ASSESSMENT — ENCOUNTER SYMPTOMS
SHORTNESS OF BREATH: 0
RHINORRHEA: 0
EYE ITCHING: 0
BACK PAIN: 0
ANAL BLEEDING: 0
FACIAL SWELLING: 0
RECTAL PAIN: 0
CHOKING: 0
TROUBLE SWALLOWING: 0
SORE THROAT: 0
EYE REDNESS: 0
VOMITING: 0
APNEA: 0
EYE PAIN: 0
WHEEZING: 0
SINUS PRESSURE: 0
STRIDOR: 0
CONSTIPATION: 0
ABDOMINAL DISTENTION: 0
COLOR CHANGE: 0
BLOOD IN STOOL: 0
DIARRHEA: 0
ABDOMINAL PAIN: 0
EYE DISCHARGE: 0
VOICE CHANGE: 0
PHOTOPHOBIA: 0
NAUSEA: 0
COUGH: 0
CHEST TIGHTNESS: 0

## 2022-07-06 ASSESSMENT — PATIENT HEALTH QUESTIONNAIRE - PHQ9
1. LITTLE INTEREST OR PLEASURE IN DOING THINGS: 0
2. FEELING DOWN, DEPRESSED OR HOPELESS: 0
SUM OF ALL RESPONSES TO PHQ QUESTIONS 1-9: 0
SUM OF ALL RESPONSES TO PHQ9 QUESTIONS 1 & 2: 0
SUM OF ALL RESPONSES TO PHQ QUESTIONS 1-9: 0

## 2022-07-06 NOTE — ASSESSMENT & PLAN NOTE
Well-controlled, continue current medications   Controlled Substance Monitoring:    Acute and Chronic Pain Monitoring:   RX Monitoring 12/8/2021   Attestation -   Periodic Controlled Substance Monitoring Possible medication side effects, risk of tolerance/dependence & alternative treatments discussed. ;No signs of potential drug abuse or diversion identified. ;Assessed functional status.    Chronic Pain > 50 MEDD -   Chronic Pain > 80 MEDD -       -refill

## 2022-07-06 NOTE — PROGRESS NOTES
Subjective:     Patient ID:Carmen Martinez is a 74 y.o. female. HPI  --chronic insomnia--and has need for ambien--discussed weaning  Allergies   Allergen Reactions    Adhesive Tape     Aspirin      GI Bleed    Sulfa Antibiotics Rash       Current Outpatient Medications   Medication Sig Dispense Refill    zolpidem (AMBIEN) 10 MG tablet TAKE ONE TABLET BY MOUTH EVERY NIGHT AT BEDTIME MAY TAKE ANOTHER 1/2 TABLET BY MOUTH AS NEEDED 45 tablet 0    promethazine (PHENERGAN) 25 MG tablet TAKE ONE TABLET BY MOUTH EVERY 6 HOURS AS NEEDED 40 tablet 3    traZODone (DESYREL) 100 MG tablet TAKE ONE TABLET BY MOUTH EVERY NIGHT AT BEDTIME 30 tablet 3    DULoxetine (CYMBALTA) 30 MG extended release capsule Take 30 mg by mouth daily      lisinopril (PRINIVIL;ZESTRIL) 10 MG tablet TAKE ONE TABLET BY MOUTH DAILY 30 tablet 2    amantadine (SYMMETREL) 100 MG capsule TAKE ONE CAPSULE BY MOUTH TWICE A DAY 60 capsule 0    metoprolol tartrate (LOPRESSOR) 25 MG tablet TAKE ONE TABLET BY MOUTH TWICE A DAY 60 tablet 1    oxyCODONE-acetaminophen (PERCOCET) 5-325 MG per tablet       Biotin 300 MCG TABS Take by mouth      cyclobenzaprine (AMRIX) 15 MG extended release capsule Take 15 mg by mouth      chlorzoxazone (PARAFON FORTE) 750 MG TABS tablet Take 750 mg by mouth      ibuprofen (ADVIL;MOTRIN) 600 MG tablet Take 600 mg by mouth      cetirizine (ZYRTEC) 10 MG tablet Take 10 mg by mouth      Cholecalciferol 2000 UNITS CAPS Take by mouth      docusate sodium (COLACE) 100 MG capsule Take 100 mg by mouth      famotidine (PEPCID) 40 MG tablet Take 40 mg by mouth      melatonin 5 MG TABS tablet Take 5 mg by mouth daily      HYDROcodone-acetaminophen (NORCO) 7.5-325 MG per tablet Take 1 tablet by mouth every 6 hours as needed for Pain.  gabapentin (NEURONTIN) 800 MG tablet Take 800 mg by mouth 4 times daily        No current facility-administered medications for this visit.        Past Medical History:   Diagnosis Date    Bladder carcinoma ([facility] [address] 7/6/2022    Chronic pain syndrome     lumbar spine--sees Dr. Josselyn Etienne    Hypertension     Insomnia     Tachycardia        Past Surgical History:   Procedure Laterality Date    COLONOSCOPY  07/27/2018    neg q 10    LUMBAR LAMINECTOMY  2009.2010    SPINE SURGERY  04/2016    spinal chord stimulator    UPPER GASTROINTESTINAL ENDOSCOPY  2007    ? 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    Smokeless tobacco: Never Used   Substance and Sexual Activity    Alcohol use: No    Drug use: No    Sexual activity: Not Currently     Partners: Male   Other Topics Concern    Not on file   Social History Narrative    Works 36/w(nights)    Hobbies; reads    Exercise; little    + seatbelts     Social Determinants of Health     Financial Resource Strain: Low Risk     Difficulty of Paying Living Expenses: Not hard at all   Food Insecurity: No Food Insecurity    Worried About [address] in the Last Year: Never true    [address] [facility] [address] in the Last Year: Never true   Transportation Needs:     Lack of Transportation (Medical): Not on file    Lack of Transportation (Non-Medical):  Not on file   Physical Activity:     Days of Exercise per Week: Not on file    Minutes of Exercise per Session: Not on file   Stress:     Feeling of Stress : Not on file   Social Connections:     Frequency of Communication with Friends and Family: Not on file    Frequency of Social Gatherings with Friends and Family: Not on file    Attends Yazidism Services: Not on file    Active Member of Clubs or Organizations: Not on file    Attends Club or Organization Meetings: Not on file    Marital Status: Not on file   Intimate Partner Violence:  Fear of Current or Ex-Partner: Not on file    Emotionally Abused: Not on file    Physically Abused: Not on file    Sexually Abused: Not on file   Housing Stability:     Unable to Pay for Housing in the Last Year: Not on file    Number of Places Lived in the Last Year: Not on file    Unstable Housing in the Last Year: Not on file       Family History   Problem Relation Age of Onset    Cancer Mother         breast    High Blood Pressure Mother     Alzheimer's Disease Father     High Blood Pressure Father        Immunization History   Administered Date(s) Administered    COVID-19, PFIZER PURPLE top, DILUTE for use, (age 15 y+), 30mcg/0.3mL 01/05/2021, 01/29/2021, 10/14/2021    Influenza Vaccine, unspecified formulation 10/10/2016    Influenza Virus Vaccine 09/12/2011, 10/30/2019    Influenza, Jeb Golas, Recombinant, IM PF (Flublok 18 yrs and older) 10/30/2019, 11/12/2020, 10/14/2021    Tdap (Boostrix, Adacel) 02/02/2012    Zoster Live (Zostavax) 10/10/2016    Zoster Recombinant (Shingrix) 10/30/2019       Review of Systems  Review of Systems   Constitutional: Negative for activity change, appetite change, chills, diaphoresis, fatigue, fever and unexpected weight change. HENT: Negative for congestion, dental problem, drooling, ear discharge, ear pain, facial swelling, hearing loss, mouth sores, nosebleeds, postnasal drip, rhinorrhea, sinus pressure, sneezing, sore throat, tinnitus, trouble swallowing and voice change. Eyes: Negative for photophobia, pain, discharge, redness, itching and visual disturbance. Respiratory: Negative for apnea, cough, choking, chest tightness, shortness of breath, wheezing and stridor. Cardiovascular: Negative for chest pain, palpitations and leg swelling. Gastrointestinal: Negative for abdominal distention, abdominal pain, anal bleeding, blood in stool, constipation, diarrhea, nausea, rectal pain and vomiting.    Genitourinary: Negative for difficulty urinating, dysuria, enuresis, flank pain, frequency, genital sores and hematuria. Musculoskeletal: Negative for arthralgias, back pain, gait problem, joint swelling, myalgias, neck pain and neck stiffness. Skin: Negative for color change, pallor, rash and wound. Neurological: Negative for dizziness, tremors, seizures, syncope, facial asymmetry, speech difficulty, weakness, light-headedness, numbness and headaches. Hematological: Negative for adenopathy. Does not bruise/bleed easily. Psychiatric/Behavioral: Negative for agitation, behavioral problems, confusion, decreased concentration, dysphoric mood, hallucinations, self-injury, sleep disturbance and suicidal ideas. The patient is not nervous/anxious and is not hyperactive. Objective:   Physical Exam  Physical Exam  Vitals reviewed. Constitutional:       General: She is not in acute distress. Appearance: She is well-developed. Eyes:      Conjunctiva/sclera: Conjunctivae normal.      Pupils: Pupils are equal, round, and reactive to light. Neck:      Thyroid: No thyromegaly. Vascular: No JVD. Trachea: No tracheal deviation. Cardiovascular:      Rate and Rhythm: Normal rate and regular rhythm. Heart sounds: Normal heart sounds. No murmur heard. No gallop. Pulmonary:      Effort: Pulmonary effort is normal. No respiratory distress. Breath sounds: Normal breath sounds. No stridor. No wheezing or rales. Chest:      Chest wall: No tenderness. Abdominal:      General: Bowel sounds are normal. There is no distension. Palpations: Abdomen is soft. There is no mass. Tenderness: There is no abdominal tenderness. Musculoskeletal:         General: No tenderness. Lymphadenopathy:      Cervical: No cervical adenopathy. Skin:     General: Skin is warm and dry. Coloration: Skin is not pale. Findings: No erythema or rash. Neurological:      Mental Status: She is alert and oriented to person, place, and time.

## 2022-08-09 ENCOUNTER — OFFICE VISIT (OUTPATIENT)
Dept: FAMILY MEDICINE CLINIC | Age: 67
End: 2022-08-09
Payer: MEDICARE

## 2022-08-09 VITALS
OXYGEN SATURATION: 98 % | TEMPERATURE: 97.1 F | BODY MASS INDEX: 28.43 KG/M2 | HEART RATE: 71 BPM | SYSTOLIC BLOOD PRESSURE: 110 MMHG | HEIGHT: 59 IN | WEIGHT: 141 LBS | DIASTOLIC BLOOD PRESSURE: 80 MMHG

## 2022-08-09 DIAGNOSIS — Z78.0 ASYMPTOMATIC MENOPAUSAL STATE: ICD-10-CM

## 2022-08-09 DIAGNOSIS — Z23 NEED FOR PNEUMOCOCCAL VACCINATION: ICD-10-CM

## 2022-08-09 DIAGNOSIS — Z23 NEED FOR PROPHYLACTIC VACCINATION AGAINST STREPTOCOCCUS PNEUMONIAE (PNEUMOCOCCUS): ICD-10-CM

## 2022-08-09 DIAGNOSIS — Z00.00 WELCOME TO MEDICARE PREVENTIVE VISIT: Primary | ICD-10-CM

## 2022-08-09 DIAGNOSIS — Z12.31 ENCOUNTER FOR SCREENING MAMMOGRAM FOR BREAST CANCER: ICD-10-CM

## 2022-08-09 DIAGNOSIS — E78.5 HYPERLIPIDEMIA, UNSPECIFIED HYPERLIPIDEMIA TYPE: ICD-10-CM

## 2022-08-09 DIAGNOSIS — C67.9 BLADDER CARCINOMA (HCC): ICD-10-CM

## 2022-08-09 DIAGNOSIS — R41.3 MEMORY LOSS OF UNKNOWN CAUSE: ICD-10-CM

## 2022-08-09 DIAGNOSIS — F51.02 ADJUSTMENT INSOMNIA: ICD-10-CM

## 2022-08-09 DIAGNOSIS — I10 ESSENTIAL HYPERTENSION: ICD-10-CM
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
## 2022-08-09 PROCEDURE — 90732 PPSV23 VACC 2 YRS+ SUBQ/IM: CPT | Performed by: NURSE PRACTITIONER

## 2022-08-09 PROCEDURE — G0402 INITIAL PREVENTIVE EXAM: HCPCS | Performed by: NURSE PRACTITIONER

## 2022-08-09 PROCEDURE — 90471 IMMUNIZATION ADMIN: CPT | Performed by: NURSE PRACTITIONER

## 2022-08-09 PROCEDURE — 1123F ACP DISCUSS/DSCN MKR DOCD: CPT | Performed by: NURSE PRACTITIONER

## 2022-08-09 RX ORDER — ZOLPIDEM TARTRATE 5 MG/1
5 TABLET ORAL NIGHTLY PRN
Qty: 30 TABLET | Refills: 0 | Status: SHIPPED | OUTPATIENT
Start: 2022-08-09 | End: 2022-09-12 | Stop reason: SDUPTHER

## 2022-08-09 RX ORDER — ZOLPIDEM TARTRATE 5 MG/1
5 TABLET ORAL NIGHTLY PRN
COMMUNITY
End: 2022-08-09 | Stop reason: SDUPTHER

## 2022-08-09 ASSESSMENT — LIFESTYLE VARIABLES
HOW MANY STANDARD DRINKS CONTAINING ALCOHOL DO YOU HAVE ON A TYPICAL DAY: PATIENT DOES NOT DRINK
HOW OFTEN DO YOU HAVE A DRINK CONTAINING ALCOHOL: NEVER

## 2022-08-09 ASSESSMENT — PATIENT HEALTH QUESTIONNAIRE - PHQ9
SUM OF ALL RESPONSES TO PHQ QUESTIONS 1-9: 0
1. LITTLE INTEREST OR PLEASURE IN DOING THINGS: 0
SUM OF ALL RESPONSES TO PHQ9 QUESTIONS 1 & 2: 0
2. FEELING DOWN, DEPRESSED OR HOPELESS: 0

## 2022-08-09 NOTE — PROGRESS NOTES
Medicare Annual Wellness Visit    Kit Kowalski is here for Medicare AWV    Assessment & Plan   Fall from standing, initial encounter  -     MRI BRAIN W [address]; Future  Memory loss of unknown cause  -     MRI BRAIN W WO CONTRAST; Future  -     CBC with Auto Differential; Future  -     Comprehensive Metabolic Panel; Future  -     TSH with Reflex to FT4; Future  Essential hypertension  Bladder carcinoma (HCC)  -     Urinalysis with Reflex to Culture  Adjustment insomnia  Hyperlipidemia, unspecified hyperlipidemia type  -     LIPID PANEL; Future    Recommendations for Preventive Services Due: see orders and patient instructions/AVS.  Recommended screening schedule for the next 5-10 years is provided to the patient in written form: see Patient Instructions/AVS.     No follow-ups on file. Subjective   The following acute and/or chronic problems were also addressed today:   Diagnosis Orders   1. Welcome to Medicare preventive visit        2. Fall from standing, initial encounter  MRI BRAIN W WO CONTRAST      3. Memory loss of unknown cause  MRI BRAIN W WO CONTRAST    CBC with Auto Differential    Comprehensive Metabolic Panel    TSH with Reflex to FT4      4. Essential hypertension        5. Bladder carcinoma ([address]  Urinalysis with Reflex to Culture      6. Adjustment insomnia  zolpidem (AMBIEN) 5 MG tablet      7. Hyperlipidemia, unspecified hyperlipidemia type  LIPID PANEL      8. Asymptomatic menopausal state  DEXA Bone Density Axial Skeleton      9. Encounter for screening mammogram for breast cancer  ARNOLDO Digital Screen Bilateral      10. Need for prophylactic vaccination against Streptococcus pneumoniae (pneumococcus)  Pneumococcal polysaccharide vaccine 23-valent PPSV23      11. Need for pneumococcal vaccination            3 weeks ago was walking to car and fell. Unsure if LOC. Got up noting no injury and drove as planned to [facility], [facility]. to be with her mother.   Upon arrival realized she had no recollection of the drive. Sister informed her she called her making nonsensical comments. Forgot about Toll payment changes despite having driven this route many times over the years. Denies any head pain after fall trauma. This has not recurred  No h/o mental status changes, CVA or TIA  Takes multiple sleep meds and has been on them for years. Denies possibility that she had lingering amnesia from her Ambien the previous night. Reports frequent falls due to not picking feet up. Wasn't concerned about this particular fall if not for the memory changes      Patient's complete Health Risk Assessment and screening values have been reviewed and are found in Flowsheets. The following problems were reviewed today and where indicated follow up appointments were made and/or referrals ordered. Positive Risk Factor Screenings with Interventions:    Fall Risk:  Do you feel unsteady or are you worried about falling? : (!) yes (right leg sometimes)  2 or more falls in past year?: (!) yes  Fall with injury in past year?: (!) yes   Fall Risk Interventions:    Imaging study ordered- MRI    Cognitive:   Words recalled: 1 Word Recalled  Clock Drawing Test (CDT): (!) Abnormal  Total Score Interpretation: Abnormal Mini-Cog  Did the patient refuse to take the cognition test?: No  Cognitive Impairment Interventions:  --------------------------------------------------------------------------------           General Health and ACP:  General  In general, how would you say your health is?: Good  In the past 7 days, have you experienced any of the following: New or Increased Pain, New or Increased Fatigue, Loneliness, Social Isolation, Stress or Anger?: (!) Yes  Select all that apply: (!) Stress  Do you get the social and emotional support that you need?: Yes  Do you have a Living Will?: (!) No    Advance Directives       Power of  Living Will ACP-Advance Directive ACP-Power of     Not on File Not on File Not on File Not on File General Health Risk Interventions:  No Living Will: Patient declines ACP discussion/assistance              Objective   Vitals:    08/09/22 1343   BP: 110/80   Site: Left Upper Arm   Position: Sitting   Cuff Size: Medium Adult   Pulse: 71   Temp: 97.1 °F (36.2 °C)   SpO2: 98%   Weight: 141 lb (64 kg)   Height: 4' 11\" (1.499 m)      Body mass index is 28.48 kg/m². General Appearance: alert and oriented to person, place and time, well developed and well- nourished, in no acute distress  Skin: warm and dry, no rash or erythema  Head: normocephalic and atraumatic  Eyes: pupils equal, round, and reactive to light, extraocular eye movements intact, conjunctivae normal  ENT: tympanic membrane, external ear and ear canal normal bilaterally, nose without deformity, nasal mucosa and turbinates normal without polyps  Neck: supple and non-tender without mass, no thyromegaly or thyroid nodules, no cervical lymphadenopathy  Pulmonary/Chest: clear to auscultation bilaterally- no wheezes, rales or rhonchi, normal air movement, no respiratory distress  Cardiovascular: normal rate, regular rhythm, normal S1 and S2, no murmurs, rubs, clicks, or gallops, distal pulses intact, no carotid bruits  Abdomen: soft, non-tender, non-distended, normal bowel sounds, no masses or organomegaly  Extremities: no cyanosis, clubbing or edema  Musculoskeletal: normal range of motion, no joint swelling, deformity or tenderness  Neurologic: reflexes normal and symmetric, no cranial nerve deficit, gait, coordination and speech normal       Allergies   Allergen Reactions    Adhesive Tape     Aspirin      GI Bleed    Sulfa Antibiotics Rash     Prior to Visit Medications    Medication Sig Taking? Authorizing Provider   zolpidem (AMBIEN) 5 MG tablet Take 5 mg by mouth nightly as needed for Sleep.  Yes Historical Provider, MD   promethazine (PHENERGAN) 25 MG tablet TAKE ONE TABLET BY MOUTH EVERY 6 HOURS AS NEEDED Yes Gerber Costello MD traZODone (DESYREL) 100 MG tablet TAKE ONE TABLET BY MOUTH EVERY NIGHT AT BEDTIME Yes Obdulia Castañeda MD   DULoxetine (CYMBALTA) 30 MG extended release capsule Take 30 mg by mouth daily Yes Historical Provider, MD   lisinopril (PRINIVIL;ZESTRIL) 10 MG tablet TAKE ONE TABLET BY MOUTH DAILY Yes Obdulia Castañeda MD   metoprolol tartrate (LOPRESSOR) 25 MG tablet TAKE ONE TABLET BY MOUTH TWICE A DAY Yes Obdulia Castañeda MD   oxyCODONE-acetaminophen (PERCOCET) 5-325 MG per tablet  Yes Historical Provider, MD   Biotin 300 MCG TABS Take by mouth Yes Historical Provider, MD   cyclobenzaprine (AMRIX) 15 MG extended release capsule Take 15 mg by mouth Yes Historical Provider, MD   cetirizine (ZYRTEC) 10 MG tablet Take 10 mg by mouth Yes Historical Provider, MD   Cholecalciferol 2000 UNITS CAPS Take by mouth Yes Historical Provider, MD   docusate sodium (COLACE) 100 MG capsule Take 100 mg by mouth Yes Historical Provider, MD   famotidine (PEPCID) 40 MG tablet Take 40 mg by mouth Yes Historical Provider, MD   melatonin 5 MG TABS tablet Take 5 mg by mouth daily Yes Historical Provider, MD   gabapentin (NEURONTIN) 800 MG tablet Take 800 mg by mouth 4 times daily  Yes Historical Provider, MD Acosta (Including outside providers/suppliers regularly involved in providing care):   Patient Care Team:  Obdulia Castañeda MD as PCP - General (Family Medicine)  Obdulia Castañeda MD as PCP - [facility] Empaneled Provider  Ulysses Roes, MD as Consulting Physician (Gastroenterology)  Eden Delacruz MD (Gynecology)     Reviewed and updated this visit:  Allergies  Meds

## 2022-08-09 NOTE — PATIENT INSTRUCTIONS
Personalized Preventive Plan for Lamar Leong - 8/9/2022  Medicare offers a range of preventive health benefits. Some of the tests and screenings are paid in full while other may be subject to a deductible, co-insurance, and/or copay. Some of these benefits include a comprehensive review of your medical history including lifestyle, illnesses that may run in your family, and various assessments and screenings as appropriate. After reviewing your medical record and screening and assessments performed today your provider may have ordered immunizations, labs, imaging, and/or referrals for you. A list of these orders (if applicable) as well as your Preventive Care list are included within your After Visit Summary for your review. Other Preventive Recommendations:    A preventive eye exam performed by an eye specialist is recommended every 1-2 years to screen for glaucoma; cataracts, macular degeneration, and other eye disorders. A preventive dental visit is recommended every 6 months. Try to get at least 150 minutes of exercise per week or 10,000 steps per day on a pedometer . Order or download the FREE \"Exercise & Physical Activity: Your Everyday Guide\" from [employer]. Call 4-365.236.9665 or search [employer] online. You need 2408-4328 mg of calcium and 2393-1056 IU of vitamin D per day. It is possible to meet your calcium requirement with diet alone, but a vitamin D supplement is usually necessary to meet this goal.  When exposed to the sun, use a sunscreen that protects against both UVA and UVB radiation with an SPF of 30 or greater. Reapply every 2 to 3 hours or after sweating, drying off with a towel, or swimming. Always wear a seat belt when traveling in a car. Always wear a helmet when riding a bicycle or motorcycle.

## 2022-09-12 DIAGNOSIS — F51.02 ADJUSTMENT INSOMNIA: ICD-10-CM

## 2022-09-12 RX ORDER — ZOLPIDEM TARTRATE 5 MG/1
5 TABLET ORAL NIGHTLY PRN
Qty: 30 TABLET | Refills: 0 | Status: SHIPPED | OUTPATIENT
Start: 2022-09-12 | End: 2022-10-13 | Stop reason: SDUPTHER

## 2022-09-12 RX ORDER — TRAZODONE HYDROCHLORIDE 100 MG/1
TABLET ORAL
Qty: 30 TABLET | Refills: 3 | Status: SHIPPED | OUTPATIENT
Start: 2022-09-12 | End: 2022-10-13 | Stop reason: SDUPTHER

## 2022-09-12 NOTE — TELEPHONE ENCOUNTER
zolpidem (AMBIEN) 10 MG tablet   traZODone (DESYREL) 100 MG tablet     Please send to:    [id number]    LOV: 8/9/22

## 2022-10-07 ENCOUNTER — TELEPHONE (OUTPATIENT)
Dept: FAMILY MEDICINE CLINIC | Age: 67
End: 2022-10-07

## 2022-10-07 NOTE — TELEPHONE ENCOUNTER
Pt called and stated that she tested positive for covid on 10/6/22. She is requesting paxlovid.     [facility] [address] [place] [address], [address] [place] [address] [phone] - F [phone]    LOV: 8/9/22

## 2022-10-12 DIAGNOSIS — F51.02 ADJUSTMENT INSOMNIA: ICD-10-CM

## 2022-10-13 RX ORDER — ZOLPIDEM TARTRATE 5 MG/1
5 TABLET ORAL NIGHTLY PRN
Qty: 30 TABLET | Refills: 0 | Status: SHIPPED | OUTPATIENT
Start: 2022-10-13 | End: 2022-11-12

## 2022-10-13 RX ORDER — TRAZODONE HYDROCHLORIDE 100 MG/1
TABLET ORAL
Qty: 30 TABLET | Refills: 3 | Status: SHIPPED | OUTPATIENT
Start: 2022-10-13

## 2022-11-11 DIAGNOSIS — F51.02 ADJUSTMENT INSOMNIA: ICD-10-CM

## 2022-11-11 RX ORDER — ZOLPIDEM TARTRATE 5 MG/1
5 TABLET ORAL NIGHTLY PRN
Qty: 30 TABLET | Refills: 0 | Status: SHIPPED | OUTPATIENT
Start: 2022-11-11 | End: 2022-12-11

## 2022-12-05 DIAGNOSIS — F51.02 ADJUSTMENT INSOMNIA: ICD-10-CM

## 2022-12-05 RX ORDER — TRAZODONE HYDROCHLORIDE 100 MG/1
TABLET ORAL
Qty: 90 TABLET | Refills: 1 | Status: SHIPPED | OUTPATIENT
Start: 2022-12-05

## 2022-12-06 DIAGNOSIS — F51.02 ADJUSTMENT INSOMNIA: ICD-10-CM

## 2022-12-06 RX ORDER — ZOLPIDEM TARTRATE 5 MG/1
5 TABLET ORAL NIGHTLY PRN
Qty: 30 TABLET | Refills: 0 | Status: SHIPPED | OUTPATIENT
Start: 2022-12-06 | End: 2022-12-08 | Stop reason: SDUPTHER

## 2022-12-08 DIAGNOSIS — F51.02 ADJUSTMENT INSOMNIA: ICD-10-CM

## 2022-12-08 RX ORDER — ZOLPIDEM TARTRATE 5 MG/1
5 TABLET ORAL NIGHTLY PRN
Qty: 30 TABLET | Refills: 0 | Status: SHIPPED | OUTPATIENT
Start: 2022-12-08 | End: 2023-01-07

## 2022-12-08 RX ORDER — ZOLPIDEM TARTRATE 5 MG/1
TABLET ORAL
Qty: 30 TABLET | OUTPATIENT
Start: 2022-12-08

## 2023-01-09 DIAGNOSIS — F51.02 ADJUSTMENT INSOMNIA: ICD-10-CM

## 2023-01-09 RX ORDER — ZOLPIDEM TARTRATE 5 MG/1
5 TABLET ORAL NIGHTLY PRN
Qty: 30 TABLET | Refills: 0 | Status: SHIPPED | OUTPATIENT
Start: 2023-01-09 | End: 2023-02-08

## 2023-02-07 DIAGNOSIS — F51.02 ADJUSTMENT INSOMNIA: ICD-10-CM

## 2023-02-07 RX ORDER — ZOLPIDEM TARTRATE 5 MG/1
5 TABLET ORAL NIGHTLY PRN
Qty: 30 TABLET | Refills: 0 | OUTPATIENT
Start: 2023-02-07 | End: 2023-03-09

## 2023-02-22 DIAGNOSIS — F51.02 ADJUSTMENT INSOMNIA: ICD-10-CM

## 2023-02-22 RX ORDER — ZOLPIDEM TARTRATE 5 MG/1
TABLET ORAL
Qty: 30 TABLET | OUTPATIENT
Start: 2023-02-22

## 2023-05-16 DIAGNOSIS — F51.02 ADJUSTMENT INSOMNIA: ICD-10-CM

## 2023-05-16 RX ORDER — TRAZODONE HYDROCHLORIDE 100 MG/1
TABLET ORAL
Qty: 30 TABLET | Refills: 4 | Status: SHIPPED | OUTPATIENT
Start: 2023-05-16

## 2023-10-03 ENCOUNTER — TELEPHONE (OUTPATIENT)
Dept: FAMILY MEDICINE CLINIC | Age: 68
End: 2023-10-03

## 2023-10-11 RX ORDER — METHYLPREDNISOLONE 4 MG/1
4 TABLET ORAL SEE ADMIN INSTRUCTIONS
Qty: 1 KIT | Refills: 0 | Status: SHIPPED | OUTPATIENT
Start: 2023-10-11 | End: 2023-10-17

## 2023-10-11 RX ORDER — PROMETHAZINE HYDROCHLORIDE 25 MG/1
TABLET ORAL
Qty: 40 TABLET | Refills: 3 | Status: SHIPPED | OUTPATIENT
Start: 2023-10-11

## 2023-10-11 NOTE — TELEPHONE ENCOUNTER
LOV: 8/9/22    Pt stated she is feeling crappy and needs this medication. She stated Dr Meche Sol usually knows what she is using this medication for. She wasn't sure if it was MEDROL, TESS or MEDROL DOSPAK.

## 2023-10-11 NOTE — TELEPHONE ENCOUNTER
Lov; 8/9/2022    Pt has an appointment in January because she is in [facility] most of the time.

## 2024-01-03 ENCOUNTER — OFFICE VISIT (OUTPATIENT)
Dept: FAMILY MEDICINE CLINIC | Age: 69
End: 2024-01-03
Payer: MEDICARE

## 2024-01-03 VITALS
DIASTOLIC BLOOD PRESSURE: 88 MMHG | BODY MASS INDEX: 30.08 KG/M2 | HEIGHT: 59 IN | HEART RATE: 93 BPM | SYSTOLIC BLOOD PRESSURE: 120 MMHG | WEIGHT: 149.2 LBS | OXYGEN SATURATION: 98 %

## 2024-01-03 DIAGNOSIS — Z78.0 MENOPAUSE: ICD-10-CM

## 2024-01-03 DIAGNOSIS — Z00.00 INITIAL MEDICARE ANNUAL WELLNESS VISIT: Primary | ICD-10-CM

## 2024-01-03 DIAGNOSIS — Z13.820 OSTEOPOROSIS SCREENING: ICD-10-CM

## 2024-01-03 DIAGNOSIS — Z00.00 WELL ADULT EXAM: ICD-10-CM

## 2024-01-03 DIAGNOSIS — Z12.31 ENCOUNTER FOR SCREENING MAMMOGRAM FOR MALIGNANT NEOPLASM OF BREAST: ICD-10-CM

## 2024-01-03 DIAGNOSIS — C67.9 BLADDER CARCINOMA (HCC): ICD-10-CM

## 2024-01-03 DIAGNOSIS — I10 ESSENTIAL HYPERTENSION: ICD-10-CM

## 2024-01-03 DIAGNOSIS — G89.4 CHRONIC PAIN SYNDROME: ICD-10-CM

## 2024-01-03 PROCEDURE — G0438 PPPS, INITIAL VISIT: HCPCS | Performed by: FAMILY MEDICINE

## 2024-01-03 PROCEDURE — 3079F DIAST BP 80-89 MM HG: CPT | Performed by: FAMILY MEDICINE

## 2024-01-03 PROCEDURE — 3074F SYST BP LT 130 MM HG: CPT | Performed by: FAMILY MEDICINE

## 2024-01-03 PROCEDURE — 1123F ACP DISCUSS/DSCN MKR DOCD: CPT | Performed by: FAMILY MEDICINE

## 2024-01-03 PROCEDURE — G8484 FLU IMMUNIZE NO ADMIN: HCPCS | Performed by: FAMILY MEDICINE

## 2024-01-03 PROCEDURE — 3017F COLORECTAL CA SCREEN DOC REV: CPT | Performed by: FAMILY MEDICINE

## 2024-01-03 SDOH — ECONOMIC STABILITY: FOOD INSECURITY: WITHIN THE PAST 12 MONTHS, YOU WORRIED THAT YOUR FOOD WOULD RUN OUT BEFORE YOU GOT MONEY TO BUY MORE.: NEVER TRUE

## 2024-01-03 SDOH — ECONOMIC STABILITY: FOOD INSECURITY: WITHIN THE PAST 12 MONTHS, THE FOOD YOU BOUGHT JUST DIDN'T LAST AND YOU DIDN'T HAVE MONEY TO GET MORE.: NEVER TRUE

## 2024-01-03 SDOH — ECONOMIC STABILITY: HOUSING INSECURITY
IN THE LAST 12 MONTHS, WAS THERE A TIME WHEN YOU DID NOT HAVE A STEADY PLACE TO SLEEP OR SLEPT IN A SHELTER (INCLUDING NOW)?: NO

## 2024-01-03 SDOH — ECONOMIC STABILITY: INCOME INSECURITY: HOW HARD IS IT FOR YOU TO PAY FOR THE VERY BASICS LIKE FOOD, HOUSING, MEDICAL CARE, AND HEATING?: NOT HARD AT ALL

## 2024-01-03 ASSESSMENT — LIFESTYLE VARIABLES
HOW OFTEN DO YOU HAVE A DRINK CONTAINING ALCOHOL: NEVER
HOW MANY STANDARD DRINKS CONTAINING ALCOHOL DO YOU HAVE ON A TYPICAL DAY: PATIENT DOES NOT DRINK

## 2024-01-03 ASSESSMENT — PATIENT HEALTH QUESTIONNAIRE - PHQ9
2. FEELING DOWN, DEPRESSED OR HOPELESS: 0
1. LITTLE INTEREST OR PLEASURE IN DOING THINGS: 0
SUM OF ALL RESPONSES TO PHQ QUESTIONS 1-9: 0
SUM OF ALL RESPONSES TO PHQ9 QUESTIONS 1 & 2: 0
SUM OF ALL RESPONSES TO PHQ QUESTIONS 1-9: 0

## 2024-01-03 NOTE — PATIENT INSTRUCTIONS

## 2024-01-03 NOTE — PROGRESS NOTES
Medicare Annual Wellness Visit    Carmen Abel is here for Medicare AW    Assessment & Plan   Osteoporosis screening  Encounter for screening mammogram for malignant neoplasm of breast  Bladder carcinoma (HCC)  Assessment & Plan:   Monitored by specialist- no acute findings meriting change in the plan--neg cystos  Essential hypertension  Assessment & Plan:   Well-controlled, continue current medications  Chronic pain syndrome  Assessment & Plan:   Well-controlled, continue current medications--sees pain management    Recommendations for Preventive Services Due: see orders and patient instructions/AVS.  Recommended screening schedule for the next 5-10 years is provided to the patient in written form: see Patient Instructions/AVS.     No follow-ups on file.     Subjective   The following acute and/or chronic problems were also addressed today:  Bladder carcinoma (HCC)   Monitored by specialist- no acute findings meriting change in the plan--neg cystos    Essential hypertension   Well-controlled, continue current medications    Chronic pain syndrome   Well-controlled, continue current medications--sees pain management      Patient's complete Health Risk Assessment and screening values have been reviewed and are found in Flowsheets. The following problems were reviewed today and where indicated follow up appointments were made and/or referrals ordered.    Positive Risk Factor Screenings with Interventions:            Controlled Medication Review:      Today's Pain Level: No data recorded   Opioid Risk: (Low risk score <55) Opioid risk score: 13    Patient is low risk for opioid use disorder or overdose.    Last PDMP [place] as Reviewed:  Review User Review Instant Review Result   ELKIN SNIDER 9/12/2022 11:58 AM     Reviewed PDMP [1]     Last Controlled Substance Monitoring Documentation      Flowsheet Row Office Visit from 12/8/2021 in [facility]   Periodic Controlled Substance

## 2024-01-09 DIAGNOSIS — F51.02 ADJUSTMENT INSOMNIA: ICD-10-CM

## 2024-01-09 RX ORDER — TRAZODONE HYDROCHLORIDE 100 MG/1
TABLET ORAL
Qty: 30 TABLET | Refills: 4 | Status: SHIPPED | OUTPATIENT
Start: 2024-01-09

## 2024-01-25 ENCOUNTER — HOSPITAL ENCOUNTER (OUTPATIENT)
Dept: GENERAL RADIOLOGY | Age: 69
Discharge: HOME | End: 2024-01-25
Attending: FAMILY MEDICINE
Payer: MEDICARE

## 2024-01-25 ENCOUNTER — HOSPITAL ENCOUNTER (OUTPATIENT)
Dept: MAMMOGRAPHY | Age: 69
Discharge: HOME | End: 2024-01-25
Attending: FAMILY MEDICINE
Payer: MEDICARE

## 2024-01-25 VITALS — HEIGHT: 59 IN | WEIGHT: 147 LBS | BODY MASS INDEX: 29.64 KG/M2

## 2024-01-25 DIAGNOSIS — Z12.31 VISIT FOR SCREENING MAMMOGRAM: ICD-10-CM

## 2024-01-25 DIAGNOSIS — Z12.31 ENCOUNTER FOR SCREENING MAMMOGRAM FOR MALIGNANT NEOPLASM OF BREAST: ICD-10-CM

## 2024-01-25 DIAGNOSIS — Z78.0 MENOPAUSE: ICD-10-CM

## 2024-01-25 DIAGNOSIS — I10 ESSENTIAL HYPERTENSION: ICD-10-CM

## 2024-01-25 DIAGNOSIS — Z13.820 OSTEOPOROSIS SCREENING: ICD-10-CM

## 2024-01-25 LAB
ALBUMIN SERPL-MCNC: 4.1 G/DL (ref 3.4–5)
ALBUMIN/GLOB SERPL: 1.9 {RATIO} (ref 1.1–2.2)
ALP SERPL-CCNC: 131 U/L (ref 40–129)
ALT SERPL-CCNC: 19 U/L (ref 10–40)
ANION GAP SERPL CALCULATED.3IONS-SCNC: 8 MMOL/L (ref 3–16)
AST SERPL-CCNC: 23 U/L (ref 15–37)
BASOPHILS # BLD: 0 K/UL (ref 0–0.2)
BASOPHILS NFR BLD: 0.4 %
BILIRUB SERPL-MCNC: <0.2 MG/DL (ref 0–1)
BUN SERPL-MCNC: 15 MG/DL (ref 7–20)
CALCIUM SERPL-MCNC: 8.9 MG/DL (ref 8.3–10.6)
CHLORIDE SERPL-SCNC: 105 MMOL/L (ref 99–110)
CHOLEST SERPL-MCNC: 218 MG/DL (ref 0–199)
CO2 SERPL-SCNC: 26 MMOL/L (ref 21–32)
CREAT SERPL-MCNC: 0.7 MG/DL (ref 0.6–1.2)
DEPRECATED RDW RBC AUTO: 14.8 % (ref 12.4–15.4)
EOSINOPHIL # BLD: 0.1 K/UL (ref 0–0.6)
EOSINOPHIL NFR BLD: 0.7 %
GFR SERPLBLD CREATININE-BSD FMLA CKD-EPI: >60 ML/MIN/{1.73_M2}
GLUCOSE SERPL-MCNC: 124 MG/DL (ref 70–99)
HCT VFR BLD AUTO: 33.8 % (ref 36–48)
HDLC SERPL-MCNC: 65 MG/DL (ref 40–60)
HGB BLD-MCNC: 11.4 G/DL (ref 12–16)
LDLC SERPL CALC-MCNC: 109 MG/DL
LYMPHOCYTES # BLD: 2.1 K/UL (ref 1–5.1)
LYMPHOCYTES NFR BLD: 28.8 %
MCH RBC QN AUTO: 29.9 PG (ref 26–34)
MCHC RBC AUTO-ENTMCNC: 33.7 G/DL (ref 31–36)
MCV RBC AUTO: 88.9 FL (ref 80–100)
MONOCYTES # BLD: 0.3 K/UL (ref 0–1.3)
MONOCYTES NFR BLD: 4.6 %
NEUTROPHILS # BLD: 4.8 K/UL (ref 1.7–7.7)
NEUTROPHILS NFR BLD: 65.5 %
PLATELET # BLD AUTO: 245 K/UL (ref 135–450)
PMV BLD AUTO: 7.8 FL (ref 5–10.5)
POTASSIUM SERPL-SCNC: 3.8 MMOL/L (ref 3.5–5.1)
PROT SERPL-MCNC: 6.3 G/DL (ref 6.4–8.2)
RBC # BLD AUTO: 3.81 M/UL (ref 4–5.2)
SODIUM SERPL-SCNC: 139 MMOL/L (ref 136–145)
TRIGL SERPL-MCNC: 222 MG/DL (ref 0–150)
VLDLC SERPL CALC-MCNC: 44 MG/DL
WBC # BLD AUTO: 7.3 K/UL (ref 4–11)

## 2024-01-25 PROCEDURE — 77063 BREAST TOMOSYNTHESIS BI: CPT

## 2024-01-25 PROCEDURE — 77080 DXA BONE DENSITY AXIAL: CPT

## 2024-01-26 DIAGNOSIS — R73.9 HYPERGLYCEMIA: Primary | ICD-10-CM

## 2024-01-29 DIAGNOSIS — R73.9 HYPERGLYCEMIA: ICD-10-CM

## 2024-01-29 LAB
EST. AVERAGE GLUCOSE BLD GHB EST-MCNC: 116.9 MG/DL
HBA1C MFR BLD: 5.7 %

## 2024-05-28 ENCOUNTER — TELEPHONE (OUTPATIENT)
Dept: FAMILY MEDICINE CLINIC | Age: 69
End: 2024-05-28

## 2024-05-28 RX ORDER — PREDNISONE 20 MG/1
40 TABLET ORAL DAILY
Qty: 10 TABLET | Refills: 0 | Status: SHIPPED | OUTPATIENT
Start: 2024-05-28 | End: 2024-06-02

## 2024-05-28 NOTE — TELEPHONE ENCOUNTER
Pt said that her right hip has been inflamed the past few weeks and just ibuprofen isn't working as well anymore and is wondering if she could have some prednisone called in

## 2024-05-30 ENCOUNTER — TELEPHONE (OUTPATIENT)
Dept: FAMILY MEDICINE CLINIC | Age: 69
End: 2024-05-30

## 2024-05-30 NOTE — TELEPHONE ENCOUNTER
[facility] called stating they were unable to get a hold of the pt. They were making sure everything was okay and that she did her genetic cancer screening test. Called pt and she stated she is out of town and she will do the test once she's back in town.

## 2024-05-30 NOTE — TELEPHONE ENCOUNTER
Pt just wanted Dr Cheung to know that she is feeling much better since she has started on the pred.

## 2024-06-11 DIAGNOSIS — F51.02 ADJUSTMENT INSOMNIA: ICD-10-CM

## 2024-06-11 RX ORDER — TRAZODONE HYDROCHLORIDE 100 MG/1
TABLET ORAL
Qty: 30 TABLET | Refills: 4 | Status: SHIPPED | OUTPATIENT
Start: 2024-06-11

## 2024-10-16 DIAGNOSIS — F51.02 ADJUSTMENT INSOMNIA: ICD-10-CM

## 2024-10-16 RX ORDER — TRAZODONE HYDROCHLORIDE 100 MG/1
TABLET ORAL
Qty: 30 TABLET | Refills: 4 | Status: SHIPPED | OUTPATIENT
Start: 2024-10-16

## 2024-11-06 RX ORDER — PROMETHAZINE HYDROCHLORIDE 25 MG/1
TABLET ORAL
Qty: 40 TABLET | Refills: 3 | Status: SHIPPED | OUTPATIENT
Start: 2024-11-06

## 2025-03-15 ENCOUNTER — OFFICE VISIT (OUTPATIENT)
Age: 70
End: 2025-03-15

## 2025-03-15 VITALS
HEIGHT: 60 IN | SYSTOLIC BLOOD PRESSURE: 116 MMHG | DIASTOLIC BLOOD PRESSURE: 80 MMHG | WEIGHT: 143 LBS | HEART RATE: 115 BPM | BODY MASS INDEX: 28.07 KG/M2 | OXYGEN SATURATION: 95 % | TEMPERATURE: 98.5 F

## 2025-03-15 DIAGNOSIS — J10.00 PNEUMONIA DUE TO INFLUENZA A VIRUS: Primary | ICD-10-CM

## 2025-03-15 RX ORDER — PREDNISONE 5 MG/1
5 TABLET ORAL 2 TIMES DAILY
Qty: 10 TABLET | Refills: 0 | Status: SHIPPED | OUTPATIENT
Start: 2025-03-15 | End: 2025-03-15 | Stop reason: SDUPTHER

## 2025-03-15 RX ORDER — PREDNISONE 5 MG/1
5 TABLET ORAL 2 TIMES DAILY
Qty: 10 TABLET | Refills: 0 | Status: SHIPPED | OUTPATIENT
Start: 2025-03-15 | End: 2025-03-20

## 2025-03-15 RX ORDER — ALBUTEROL SULFATE 90 UG/1
2 INHALANT RESPIRATORY (INHALATION) EVERY 6 HOURS PRN
Qty: 18 G | Refills: 3 | Status: SHIPPED | OUTPATIENT
Start: 2025-03-15 | End: 2025-03-15 | Stop reason: SDUPTHER

## 2025-03-15 RX ORDER — ALBUTEROL SULFATE 90 UG/1
2 INHALANT RESPIRATORY (INHALATION) EVERY 6 HOURS PRN
Qty: 18 G | Refills: 3 | Status: SHIPPED | OUTPATIENT
Start: 2025-03-15

## 2025-03-15 RX ORDER — DOXYCYCLINE HYCLATE 100 MG
100 TABLET ORAL 2 TIMES DAILY
Qty: 14 TABLET | Refills: 0 | Status: SHIPPED | OUTPATIENT
Start: 2025-03-15 | End: 2025-03-22

## 2025-03-15 RX ORDER — DOXYCYCLINE HYCLATE 100 MG
100 TABLET ORAL 2 TIMES DAILY
Qty: 14 TABLET | Refills: 0 | Status: SHIPPED | OUTPATIENT
Start: 2025-03-15 | End: 2025-03-15 | Stop reason: SDUPTHER

## 2025-03-15 NOTE — PROGRESS NOTES
Carmen Abel (:  1955) is a 70 y.o. female,Established patient, here for evaluation of the following chief complaint(s):  Cough and Fever (X 21days)      Assessment & Plan :  Visit Diagnoses and Associated Orders         Pneumonia due to influenza A virus    -  Primary    albuterol sulfate HFA (PROVENTIL HFA) 108 (90 Base) MCG/ACT inhaler [22187]      doxycycline hyclate (VIBRA-TABS) 100 MG tablet [2625]      predniSONE (DELTASONE) 5 MG tablet [6497]                 Take medications as directed  Supportive management with rest and clear fluids as tolerated  OTC meds for additional symptom relief as tolerated  Follow up in 2-3 days if symptoms persist.  Seek emergent medical attention if symptoms worsen.        Subjective :  HPI     70 y.o. female presents with symptoms of cough, fever and malaise for the last few days after recovering from influenza A within the past 30 days. Felt better for a little while after completing the Tamiflu but starting feel worse this week. Has been taking cough syrup with minimal relief of symptoms. Denies chills, nausea, vomiting, diarrhea, unchanged body aches, skin rashes. Admits to mild headaches and fatigue. Denies shortness of breath.          Vitals:    03/15/25 1537   BP: 116/80   BP Site: Right Upper Arm   Patient Position: Sitting   BP Cuff Size: Medium Adult   Pulse: (!) 115   Temp: 98.5 °F (36.9 °C)   TempSrc: Oral   SpO2: 95%   Weight: 64.9 kg (143 lb)   Height: 1.524 m (5')       No results found for this visit on 03/15/25.      Objective   Physical Exam  Constitutional:       General: She is not in acute distress.     Appearance: Normal appearance. She is not ill-appearing or toxic-appearing.   HENT:      Head: Normocephalic.      Right Ear: Tympanic membrane, ear canal and external ear normal.      Left Ear: Tympanic membrane, ear canal and external ear normal.      Nose: Nose normal.      Mouth/Throat:      Mouth: Mucous membranes are moist.      Pharynx:

## 2025-03-20 DIAGNOSIS — F51.02 ADJUSTMENT INSOMNIA: ICD-10-CM

## 2025-03-20 RX ORDER — TRAZODONE HYDROCHLORIDE 100 MG/1
100 TABLET ORAL NIGHTLY
Qty: 30 TABLET | Refills: 4 | OUTPATIENT
Start: 2025-03-20

## 2025-03-20 NOTE — TELEPHONE ENCOUNTER
LV 1/3/24 Skye PLEITEZ not scheduled Left voicemail letting know a follow up appointment needs to be made in order to receive refills.